# Patient Record
Sex: FEMALE | Race: WHITE
[De-identification: names, ages, dates, MRNs, and addresses within clinical notes are randomized per-mention and may not be internally consistent; named-entity substitution may affect disease eponyms.]

---

## 2018-10-11 ENCOUNTER — HOSPITAL ENCOUNTER (EMERGENCY)
Dept: HOSPITAL 97 - ER | Age: 63
Discharge: ANOTHER HOSPITAL | End: 2018-10-11
Payer: COMMERCIAL

## 2018-10-11 DIAGNOSIS — D43.1: Primary | ICD-10-CM

## 2018-10-11 LAB
ALBUMIN SERPL BCP-MCNC: 3.8 G/DL (ref 3.4–5)
ALP SERPL-CCNC: 90 U/L (ref 45–117)
ALT SERPL W P-5'-P-CCNC: 16 U/L (ref 12–78)
ANISOCYTOSIS BLD QL: (no result)
AST SERPL W P-5'-P-CCNC: 33 U/L (ref 15–37)
BLD SMEAR INTERP: (no result)
BUN BLD-MCNC: 16 MG/DL (ref 7–18)
GIANT PLATELETS BLD QL SMEAR: (no result)
GLUCOSE SERPLBLD-MCNC: 95 MG/DL (ref 74–106)
HCT VFR BLD CALC: 33.8 % (ref 36–45)
INR BLD: 1.09
LYMPHOCYTES # SPEC AUTO: 1.1 K/UL (ref 0.7–4.9)
MAGNESIUM SERPL-MCNC: 2.3 MG/DL (ref 1.8–2.4)
MCH RBC QN AUTO: 22.8 PG (ref 27–35)
MCV RBC: 69.7 FL (ref 80–100)
MORPHOLOGY BLD-IMP: (no result)
NT-PROBNP SERPL-MCNC: 28 PG/ML (ref ?–125)
PMV BLD: 9.2 FL (ref 7.6–11.3)
POIKILOCYTOSIS BLD QL SMEAR: (no result)
POTASSIUM SERPL-SCNC: 3.4 MMOL/L (ref 3.5–5.1)
RBC # BLD: 4.85 M/UL (ref 3.86–4.86)
STOMATOCYTES BLD QL SMEAR: (no result)
TROPONIN (EMERG DEPT USE ONLY): < 0.02 NG/ML (ref 0–0.04)

## 2018-10-11 PROCEDURE — 83880 ASSAY OF NATRIURETIC PEPTIDE: CPT

## 2018-10-11 PROCEDURE — 85610 PROTHROMBIN TIME: CPT

## 2018-10-11 PROCEDURE — 83735 ASSAY OF MAGNESIUM: CPT

## 2018-10-11 PROCEDURE — 70450 CT HEAD/BRAIN W/O DYE: CPT

## 2018-10-11 PROCEDURE — 84484 ASSAY OF TROPONIN QUANT: CPT

## 2018-10-11 PROCEDURE — 80076 HEPATIC FUNCTION PANEL: CPT

## 2018-10-11 PROCEDURE — 85025 COMPLETE CBC W/AUTO DIFF WBC: CPT

## 2018-10-11 PROCEDURE — 93005 ELECTROCARDIOGRAM TRACING: CPT

## 2018-10-11 PROCEDURE — 36415 COLL VENOUS BLD VENIPUNCTURE: CPT

## 2018-10-11 PROCEDURE — 96374 THER/PROPH/DIAG INJ IV PUSH: CPT

## 2018-10-11 PROCEDURE — 80048 BASIC METABOLIC PNL TOTAL CA: CPT

## 2018-10-11 PROCEDURE — 99285 EMERGENCY DEPT VISIT HI MDM: CPT

## 2018-10-11 NOTE — RAD REPORT
EXAM DESCRIPTION:  CT - Head Brain Wo Cont - 10/11/2018 4:40 pm

 

CLINICAL HISTORY:  Alteration of consciousness/confusion

 

COMPARISON:  None.

 

TECHNIQUE:  Computed axial tomography of the head was obtained. IV contrast was not requested.

 

All CT scans are performed using dose optimization technique as appropriate and may include automated
 exposure control or mA/KV adjustment according to patient size.

 

FINDINGS:  3.9 centimeter mass is present within the left frontal lobe. Large amount of surrounding v
asogenic edema is seen.

 

Shift of the midline structures 9 millimeters to the right is present.

 

Intracranial bleed is not noted.

 

IMPRESSION:  3.9 centimeter mass the left frontal lobe with large amount of surrounding vasogenic raghav
ma could either represent a primary neoplasm or metastasis.

 

Examination was discussed with Rikki Gamino in the Emergency Room at 4:50 p.m. on 10/11/2018

## 2018-10-11 NOTE — EKG
Test Date:    2018-10-11               Test Time:    16:26:33

Technician:   RUSS                                     

                                                     

MEASUREMENT RESULTS:                                       

Intervals:                                           

Rate:         68                                     

WV:           170                                    

QRSD:         76                                     

QT:           394                                    

QTc:          418                                    

Axis:                                                

P:            48                                     

WV:           170                                    

QRS:          -7                                     

T:            18                                     

                                                     

INTERPRETIVE STATEMENTS:                                       

                                                     

Normal sinus rhythm

Cannot rule out Anterior infarct, age undetermined

Abnormal ECG

No previous ECG available for comparison



Electronically Signed On 10-11-18 16:50:20 CDT by Matty Powers

## 2018-10-11 NOTE — EDPHYS
Physician Documentation                                                                           

 [facility]                                                                

Name: Mary Closs                                                                                  

Age: 63 yrs                                                                                       

Sex: Female                                                                                       

: 1955                                                                                   

MRN: [id number]                                                                                   

Arrival Date: 10/11/2018                                                                          

Time: 15:42                                                                                       

Account#: [id number]                                                                            

Bed External Waiting                                                                              

Private MD:                                                                                       

ED Physician Michael Hsieh                                                                       

HPI:                                                                                              

10/11                                                                                             

16:18 This 63 yrs old  Female presents to ER via Ambulatory with complaints of       cp  

      Memory Loss.                                                                                

16:18 The patient's problem is reported as difficulty with memory. Onset: The                 cp  

      symptoms/episode began/occurred gradually, and became worse 4 day(s) ago. Duration: The     

      episode is continuous. Associated signs and symptoms: Pertinent negatives: abdominal        

      pain, chest pain, headache, palpitations, weakness. Severity of symptoms: in the            

      emergency department the symptoms are unchanged. Patient's baseline: Neuro: alert and       

      fully oriented, Motor: no deficits, Ambulation: walks without assistance, Speech:           

      normal.                                                                                     

                                                                                                  

Historical:                                                                                       

- Allergies:                                                                                      

15:45 No Known Allergies;                                                                     sv  

- Home Meds:                                                                                      

15:45 Prevacid Oral [Active]; Iron CR Oral [Active];                                          sv  

- PSHx:                                                                                           

15:45 gastric sleeve; esophagus;                                                              sv  

                                                                                                  

- Immunization history:: Adult Immunizations unknown.                                             

- Social history:: Smoking status: Patient/guardian denies using tobacco.                         

- Ebola Screening: : No symptoms or risks identified at this time.                                

                                                                                                  

                                                                                                  

ROS:                                                                                              

16:20 Constitutional: Negative for body aches, chills, fever, poor PO intake.                 cp  

16:20 Eyes: Negative for injury, pain, redness, and discharge.                                cp  

16:20 Cardiovascular: Negative for chest pain, edema, palpitations.                               

16:20 Respiratory: Negative for cough, shortness of breath, wheezing.                             

16:20 Abdomen/GI: Negative for abdominal pain, nausea, vomiting, and diarrhea.                    

16:20 Neuro: Negative for altered mental status, dizziness, headache, numbness, seizure           

      activity, visual changes, weakness.                                                         

16:20 All other systems are negative.                                                             

                                                                                                  

Exam:                                                                                             

16:31 ECG was reviewed by the Attending Physician.                                            cp  

16:39 Head/Face:  Normocephalic, atraumatic. Eyes:  Pupils equal round and reactive to light, cp  

      extra-ocular motions intact.  Lids and lashes normal.  Conjunctiva and sclera are           

      non-icteric and not injected.  Cornea within normal limits.  Periorbital areas with no      

      swelling, redness, or edema. ENT:  Nares patent. No nasal discharge, no septal              

      abnormalities noted.  Tympanic membranes are normal and external auditory canals are        

      clear.  Oropharynx with no redness, swelling, or masses, exudates, or evidence of           

      obstruction, uvula midline.  Mucous membranes moist. Neck:  Trachea midline, no             

      thyromegaly or masses palpated, and no cervical lymphadenopathy.  Supple, full range of     

      motion without nuchal rigidity, or vertebral point tenderness.  No Meningismus.             

      Chest/axilla:  Normal chest wall appearance and motion.  Nontender with no deformity.       

      No lesions are appreciated.                                                                 

16:39 Constitutional: The patient appears in no acute distress, alert, awake,                     

      non-diaphoretic, non-toxic, well developed, well nourished.                                 

16:39 Cardiovascular: Rate: normal, Rhythm: regular, Heart sounds: murmur, not appreciated,       

      rub, not appreciated, gallop, not appreciated, Edema: is not appreciated, JVD: is not       

      appreciated.                                                                                

16:39 Respiratory: the patient does not display signs of respiratory distress,  Respirations:     

      normal, no use of accessory muscles, no retractions, no splinting, no tachypnea,            

      labored breathing, is not present, Breath sounds: are clear throughout, no decreased        

      breath sounds, no stridor, no wheezing.                                                     

16:39 Abdomen/GI: Inspection: abdomen appears normal, Palpation: abdomen is soft and              

      non-tender, in all quadrants.                                                               

16:39 Skin: cellulitis, is not appreciated, no rash present.                                      

16:39 Neuro: Orientation: to person, place \T\ time. Mentation: lucid, able to follow commands,   

      Cerebellar function: Romberg testing is negative, normal finger to nose testing, Motor:     

      moves all fours, strength is normal, Sensation: no obvious gross deficits, Gait: is         

      steady, at a normal pace, without difficulty.                                               

16:55 Radiologist reports: frontal lobe mass                                                  cp  

                                                                                                  

Vital Signs:                                                                                      

15:45  / 70; Pulse 87; Resp 18; Temp 98.8(TE); Pulse Ox 100% on R/A; Weight 97.52 kg;   sv  

      Height 5 ft. 1 in. (154.94 cm); Pain 0/10;                                                  

17:13  / 61; Pulse 81; Resp 18; Pulse Ox 100% on R/A;                                   ph  

15:45 Body Mass Index 40.62 (97.52 kg, 154.94 cm)                                             sv  

                                                                                                  

MDM:                                                                                              

16:07 Patient medically screened.                                                             cp  

17:00 Differential diagnosis: CVA, TIA, Dementia, Alzheimer disease, metabolic disorder, drug cp  

      effects, brain mass.                                                                        

17:19 Physician consultation: DR Ch, neurologist \T\[facility], will accept patient as transfer. cp  

17:34 Data reviewed: vital signs, nurses notes, lab test result(s), EKG, radiologic studies,  cp  

      CT scan.                                                                                    

                                                                                                  

10/11                                                                                             

16:16 Order name: Basic Metabolic Panel; Complete Time: 17:14                                   

10/11                                                                                             

17:32 Interpretation: Normal except: K 3.4; GFR 72.                                             

10/11                                                                                             

16:16 Order name: CBC with Diff; Complete Time: 17:32                                           

10/11                                                                                             

17:32 Interpretation: Normal except: HGB 11.1; HCT 33.8; MCV 69.7; MCH 22.8; RDW 26.2.          

10/11                                                                                             

16:16 Order name: LFT's; Complete Time: 17:14                                                   

10/11                                                                                             

16:16 Order name: Magnesium; Complete Time: 17:14                                               

10/11                                                                                             

16:16 Order name: NT PRO-BNP; Complete Time: 17:14                                              

10/11                                                                                             

16:16 Order name: PT-INR; Complete Time: 17:14                                                  

10/11                                                                                             

16:16 Order name: Troponin (emerg Dept Use Only); Complete Time: 17:14                          

10/11                                                                                             

16:16 Order name: EKG; Complete Time: 16:16                                                     

10/11                                                                                             

16:16 Order name: Cardiac monitoring; Complete Time: 16:45                                      

10/11                                                                                             

16:16 Order name: EKG - Nurse/Tech; Complete Time: 16:45                                        

10/11                                                                                             

16:16 Order name: CT Head Brain wo Cont; Complete Time: 16:57                                   

10/11                                                                                             

17:08 Order name: CBC Smear Scan; Complete Time: 17:32                                        EDMS

10/11                                                                                             

16:16 Order name: IV Saline Lock; Complete Time: 16:45                                          

10/11                                                                                             

16:16 Order name: Labs collected and sent; Complete Time: 16:44                                 

10/11                                                                                             

16:16 Order name: O2 Per Protocol; Complete Time: 16:44                                         

10/11                                                                                             

16:16 Order name: O2 Sat Monitoring; Complete Time: 16:44                                       

10/11                                                                                             

16:16 Order name: Accucheck Blood Glucose; Complete Time: 17:07                               cp  

                                                                                                  

EC:31 Rate is 68 beats/min. Rhythm is regular. RI interval is normal. QRS interval is normal. cp  

      QT interval is normal. Interpreted by me. Reviewed by me.                                   

                                                                                                  

Administered Medications:                                                                         

18:26 Drug: Decadron - Dexamethasone 10 mg Route: IVP; Site: left antecubital;                ph  

20:04 Follow up: Response: No adverse reaction                                                ph  

18:26 Drug: Potassium Effervescent Tablet 25 mEq Route: PO;                                   ph  

20:04 Follow up: Response: No adverse reaction                                                ph  

                                                                                                  

                                                                                                  

Disposition:                                                                                      

10/11/18 17:36 Transfer ordered to [facility]. Diagnosis is Neoplasm of       

  uncertain behavior of brain, unspecified - Left Frontal Lobe.                                   

- Reason for transfer: Higher level of care.                                                      

- Accepting physician is DR Ch.                                                                  

- Condition is Stable.                                                                            

- Problem is new.                                                                                 

- Symptoms are unchanged.                                                                         

                                                                                                  

                                                                                                  

                                                                                                  

Addendum:                                                                                         

10/13/2018                                                                                        

     13:34 Co-signature as Attending Physician, Michael Hsieh MD I agree with the assessment and   k
dr

           plan of care.                                                                          

                                                                                                  

Signatures:                                                                                       

Dispatcher [employer]                           Patrica Crespo, RN                    RN                                                      

Michael Hsieh MD MD   [facility]                                                  

Estela Overton RN                      RN   ph                                                   

Kuldip Gamino PA                         PA   cp                                                   

Dottie Martel, RN                        RN   tl2                                                  

                                                                                                  

Corrections: (The following items were deleted from the chart)                                    

10/11                                                                                             

20:59 17:36 10/11/2018 17:36 Transfer ordered to [facility]. Diagnosis is tl2 

      Neoplasm of uncertain behavior of brain, unspecified - Left Frontal Lobe. Reason for        

      transfer: Higher level of care. Accepting physician is DR Ch. Condition is Stable.         

      Problem is new. Symptoms are unchanged. cp                                                  

                                                                                                  

**************************************************************************************************

## 2018-10-11 NOTE — ER
Nurse's Notes                                                                                     

 [facility]                                                                

Name: Mary Closs                                                                                  

Age: 63 yrs                                                                                       

Sex: Female                                                                                       

: 1955                                                                                   

MRN: [id number]                                                                                   

Arrival Date: 10/11/2018                                                                          

Time: 15:42                                                                                       

Account#: [id number]                                                                            

Bed External Waiting                                                                              

Private MD:                                                                                       

Diagnosis: Neoplasm of uncertain behavior of brain, unspecified-Left Frontal Lobe                 

                                                                                                  

Presentation:                                                                                     

10/11                                                                                             

15:43 Presenting complaint: Patient states: unable to complete sentences and has had memory   sv  

      loss x at least 2 days. Transition of care: patient was not received from another           

      setting of care. Care prior to arrival: None.                                               

15:43 Method Of Arrival: Ambulatory                                                           sv  

15:43 Acuity: COREY 2                                                                           sv  

17:06 Onset of symptoms was 2018. Risk Assessment: Do you want to hurt yourself   ph  

      or someone else? Patient reports no desire to harm self or others. Initial Sepsis           

      Screen: Does the patient meet any 2 criteria? No. Patient's initial sepsis screen is        

      negative. Does the patient have a suspected source of infection? No. Patient's initial      

      sepsis screen is negative.                                                                  

                                                                                                  

Historical:                                                                                       

- Allergies:                                                                                      

15:45 No Known Allergies;                                                                     sv  

- Home Meds:                                                                                      

15:45 Prevacid Oral [Active]; Iron CR Oral [Active];                                          sv  

- PSHx:                                                                                           

15:45 gastric sleeve; esophagus;                                                              sv  

                                                                                                  

- Immunization history:: Adult Immunizations unknown.                                             

- Social history:: Smoking status: Patient/guardian denies using tobacco.                         

- Ebola Screening: : No symptoms or risks identified at this time.                                

                                                                                                  

                                                                                                  

Screenin:45 Abuse screen: Denies threats or abuse. Denies injuries from another. Nutritional        ph  

      screening: No deficits noted. Tuberculosis screening: No symptoms or risk factors           

      identified. Fall Risk None identified.                                                      

                                                                                                  

Assessment:                                                                                       

16:15 General: Appears in no apparent distress. comfortable, well groomed, Behavior is calm,  ph  

      cooperative, appropriate for age. Pain: Denies pain. Neuro: Level of Consciousness is       

      awake, alert, obeys commands, Oriented to person, place, time, situation,  are         

      equal bilaterally Moves all extremities. Full function Gait is steady, Speech with          

      expressive aphasia noted, Facial symmetry appears normal, Pupils are PERRLA, Intact         

      Reports reports that her mind "feels foggy" and that she is having difficulty focusing      

      and completing her normal daily tasks. Cardiovascular: Denies chest pain,                   

      lightheadedness, nausea, palpitations, shortness of breath, Capillary refill < 3            

      seconds in bilateral fingers Patient's skin is warm and dry. Respiratory: Airway is         

      patent Respiratory effort is even, unlabored, Respiratory pattern is regular,               

      symmetrical, Breath sounds are clear bilaterally. GI: No signs and/or symptoms were         

      reported involving the gastrointestinal system. Derm: Skin is intact, is healthy with       

      good turgor, Skin is pink, warm \T\ dry. Musculoskeletal: Circulation, motion, and          

      sensation intact. Range of motion: intact in all extremities.                               

17:05 Reassessment: Patient appears in no apparent distress at this time. Patient and/or      ph  

      family updated on plan of care and expected duration. Pain level reassessed. Pt resting     

      comfortably, family at bedside, LE Bess and RN at bedside to speak w/ pt and         

      family about CT results, pt to be transferred, awaiting acceptance.                         

18:24 Reassessment: Patient appears in no apparent distress at this time. Patient and/or      ph  

      family updated on plan of care and expected duration. Pain level reassessed. Patient is     

      alert, oriented x 3, equal unlabored respirations, skin warm/dry/pink. Report called to     

      RN at [facility].                                                             

                                                                                                  

Vital Signs:                                                                                      

15:45  / 70; Pulse 87; Resp 18; Temp 98.8(TE); Pulse Ox 100% on R/A; Weight 97.52 kg;   sv  

      Height 5 ft. 1 in. (154.94 cm); Pain 0/10;                                                  

17:13  / 61; Pulse 81; Resp 18; Pulse Ox 100% on R/A;                                   ph  

15:45 Body Mass Index 40.62 (97.52 kg, 154.94 cm)                                             sv  

                                                                                                  

ED Course:                                                                                        

15:42 Patient arrived in ED.                                                                  as  

15:44 Triage completed.                                                                       sv  

15:45 Arm band placed on.                                                                     sv  

16:06 Estela Overton, DALIA is Primary Nurse.                                                    ph  

16:07 Kuldip Gamino PA is PHCP.                                                                cp  

16:07 Michael Hsieh MD is Attending Physician.                                              cp  

16:30 Missed attempt(s): 20 gauge in right antecubital area. Bleeding controlled, band aid    dh3 

      applied, catheter tip intact.                                                               

16:35 Initial lab(s) drawn, by me, sent to lab. Inserted saline lock: 20 gauge in left        dh3 

      antecubital area, using aseptic technique. Blood collected.                                 

16:39 CT Head Brain wo Cont In Process Unspecified.                                           EDMS

16:54 EKG done, by EKG tech. reviewed by Kuldip LUJAN.                                       sm3 

17:05 No provider procedures requiring assistance completed. Patient transferred, IV remains  ph  

      in place.                                                                                   

17:06 Patient has correct armband on for positive identification. Placed in gown. Bed in low  ph  

      position. Call light in reach. Side rails up X 1. Pulse ox on. NIBP on. Warm blanket        

      given.                                                                                      

                                                                                                  

Administered Medications:                                                                         

18:26 Drug: Decadron - Dexamethasone 10 mg Route: IVP; Site: left antecubital;                ph  

20:04 Follow up: Response: No adverse reaction                                                ph  

18:26 Drug: Potassium Effervescent Tablet 25 mEq Route: PO;                                   ph  

20:04 Follow up: Response: No adverse reaction                                                ph  

                                                                                                  

                                                                                                  

Outcome:                                                                                          

17:36 ER care complete, transfer ordered by MD.                                               cp  

20:58 Transferred by ground EMS Transfer form completed.                                      tl2 

20:58 Condition: stable                                                                           

20:58 Discharge instructions given to patient, Instructed on the need for transfer.               

20:59 Patient left the ED.                                                                    tl2 

                                                                                                  

Signatures:                                                                                       

Dispatcher [employer]                           EDMS                                                 

Patrica Mae RN                    RN   Allison Crockett Patricia, RN RN ph Page, Corey, PA PA cp Knox, Taylor, RN                        RN   2                                                  

Carolina Lovett                              [facility]                                                  

Brooke Montaño                              3                                                  

                                                                                                  

Corrections: (The following items were deleted from the chart)                                    

15:48 15:45 Pulse 87bpm; Resp 18bpm; Pulse Ox 100%; Temp 98.8F; 97.52 kg; Height 5 ft. 1 in.; sv  

      BMI: 40.6; sv                                                                               

                                                                                                  

**************************************************************************************************

## 2020-01-22 ENCOUNTER — HOSPITAL ENCOUNTER (EMERGENCY)
Dept: HOSPITAL 97 - ER | Age: 65
Discharge: ANOTHER HOSPITAL | End: 2020-01-22
Payer: COMMERCIAL

## 2020-01-22 VITALS — TEMPERATURE: 97.5 F

## 2020-01-22 VITALS — SYSTOLIC BLOOD PRESSURE: 112 MMHG | DIASTOLIC BLOOD PRESSURE: 63 MMHG | OXYGEN SATURATION: 100 %

## 2020-01-22 DIAGNOSIS — Z85.841: ICD-10-CM

## 2020-01-22 DIAGNOSIS — Z85.42: ICD-10-CM

## 2020-01-22 DIAGNOSIS — G93.6: Primary | ICD-10-CM

## 2020-01-22 DIAGNOSIS — G93.89: ICD-10-CM

## 2020-01-22 LAB
BUN BLD-MCNC: 23 MG/DL (ref 7–18)
GLUCOSE SERPLBLD-MCNC: 91 MG/DL (ref 74–106)
HCT VFR BLD CALC: 40.1 % (ref 36–45)
LYMPHOCYTES # SPEC AUTO: 1 K/UL (ref 0.7–4.9)
PMV BLD: 9.4 FL (ref 7.6–11.3)
POTASSIUM SERPL-SCNC: 3.5 MMOL/L (ref 3.5–5.1)
RBC # BLD: 4.45 M/UL (ref 3.86–4.86)

## 2020-01-22 PROCEDURE — 36415 COLL VENOUS BLD VENIPUNCTURE: CPT

## 2020-01-22 PROCEDURE — 80048 BASIC METABOLIC PNL TOTAL CA: CPT

## 2020-01-22 PROCEDURE — 96374 THER/PROPH/DIAG INJ IV PUSH: CPT

## 2020-01-22 PROCEDURE — 70450 CT HEAD/BRAIN W/O DYE: CPT

## 2020-01-22 PROCEDURE — 85025 COMPLETE CBC W/AUTO DIFF WBC: CPT

## 2020-01-22 PROCEDURE — 93005 ELECTROCARDIOGRAM TRACING: CPT

## 2020-01-22 PROCEDURE — 99285 EMERGENCY DEPT VISIT HI MDM: CPT

## 2020-01-22 NOTE — XMS REPORT
Summary of Care

 Created on:2019



Patient:Closs, Mary Landry

Sex:Female

:1955

External Reference #:[id number]





Demographics







 Address  [address] [place] [address]

 

 Home Phone  [phone]

 

 Mobile Phone  [phone]

 

 Phone  [phone]

 

 Email Address  [email or web address]

 

 Preferred Language  English

 

 Marital Status  Single

 

 Scientologist Affiliation  Unknown

 

 Race  White

 

 Ethnic Group  Not  or 









Author







 Organization  [facility]

 

 Address  [address] [place] [address]









Support







 Name  Relationship  Address  Phone

 

 Chloe Allen  Sister  Unavailable  Unavailable

 

 Melvin Ponce  Unavailable  [address]  [phone]



     [place] [address]  









Care Team Providers







 Name  Role  Phone

 

 Unavailable  Primary Care Provider  Unavailable









Reason for Visit







 Reason  Comments

 

 Initial Consultation  



Consult, Test &amp; Treat (Routine)





 Status  Reason  Specialty  Diagnoses /  Referred By  Referred To



       Procedures  Contact  Contact

 

 Authorization Not    Hematology and  Diagnoses



Other secondary thrombocytopenia



Chemotherapy-induced thrombocytopenia



NP D69.59 (ICD-10-CM) - Other secondary thrombocytopenia



D69.59,T45.1X5A (ICD-10-CM) - Chemotherapy-induced thrombocytopenia



  Tung,  Mn Heme-Onc







 Needed    Oncology  NP D69.59 (ICD-10-CM) - Other secondary thrombocytopenia



D69.59,T45.1X5A (ICD-10-CM) - Chemotherapy-induced thrombocytopenia  Alphonse  
[id number]



         MD Elin



  [facility]







         [address] [place] [address]  [address]







         [address] [place] [address]



           [address]







           Phone:



           [phone]







           Fax:



           [phone]









Encounter Details







 Date  Type  Department  Care Team  Description

 

 2019  Office Visit  [facility]  Kenan Yeboah  Initial Consultation



     Hematology and  MD Dawson



  



     Oncology



  [address]



  



     [address] [place] [address]



  



     7th Floor, [address]



  [phone]



  



     [place] [address] [phone] (Fax)  



     [phone]    







Allergies

No Known Allergiesdocumented as of this encounter (statuses as of 2019)



Medications







 Medication  Sig  Dispensed  Refills  Start Date  End Date  Status

 

 Lansoprazole (PREVACID  Take  by mouth.    0      Active



 OR)            

 

 Fe Cbn-Fe  Take 1 tablet by  30 Each  3  2019    Active



 Gluc-FA-B12-C-DSS  mouth daily.          



 (FERRALET 90) 90-1 MG            



 TABS            



documented as of this encounter (statuses as of 2019)



Active Problems







 Problem  Noted Date

 

 Endometrial cancer  2018



documented as of this encounter (statuses as of 2019)



Social History







 Tobacco Use  Types  Packs/Day  Years Used  Date

 

 Never Smoker        









 Smokeless Tobacco: Never Used      









 Alcohol Use  Drinks/Week  oz/Week  Comments

 

 No      









 Alcohol Habits  Answer  Date Recorded

 

 How often do you have a drink containing alcohol?  Never  2018

 

 How many drinks containing alcohol do you have on a typical  Not asked  



 day when you are drinking?    

 

 How often do you have six or more drinks on one occasion?  Not asked  









 Sex Assigned at Birth  Date Recorded

 

 Not on file  









 Job Start Date  Occupation  Industry

 

 Not on file  Not on file  Not on file









 Travel History  Travel Start  Travel End









 No recent travel history available.



documented as of this encounter



Last Filed Vital Signs







 Vital Sign  Reading  Time Taken  Comments

 

 Blood Pressure  128/79  2019  2:55 PM CDT  

 

 Pulse  81  2019  2:55 PM CDT  

 

 Temperature  36.9 C (98.4 F)  2019  2:55 PM CDT  

 

 Respiratory Rate  18  2019  2:55 PM CDT  

 

 Oxygen Saturation  -  -  

 

 Inhaled Oxygen Concentration  -  -  

 

 Weight  100.2 kg (221 lb)  2019  2:55 PM CDT  

 

 Height  154.9 cm (5' 1")  2019  2:55 PM CDT  

 

 Body Mass Index  41.76  2019  2:55 PM CDT  



documented in this encounter



Patient Instructions

Patient InstructionsCandace Thurman CMA - 2019  3:00 [facility]

SECTION OF ONCOLOGY/HEMATOLOGY

[phone]

[phone]



Please note that all labs and or imaging results will be discussed at the next 
office visit unless told otherwise.



if a problem occurs after hours please contact our office and have physician on 
call paged 



Patient Instructions: (to be completed before next visit)

Please come in for labs  1 hour prior to your appointment at [facility] 7th floor



 Will need to add Vitamin B12, Ferritin, Fibrinogen,Folate, PT/INR, PTT

Will send rx for Ferralet to [facility]



RTC in 6 weeks



TELL US ABOUT YOUR EXPERIENCE

You may receive an email or letter from [facility] via our 
partner, Werner Guzman. This is a survey about your experience today.

Your feedback is important to us so we can improve.

If any question does not apply to your visit, please leave it blank.

Our goal is to ensure you have an exceptional experience at [facility]. If for any reason you cannot rate your experience as very good, 
please let a member of our staff know so wecan make immediate improvements.



Please don't hesitate to call if you have any questions or concerns before your 
appt.



Thanks



Candace Thurman CMA



Electronically signed by Candace Thurman CMA at 2019  4:16 PM CDT

documented in this encounter



Progress Notes

Chasity Morgan, FNP-BC - 2019  3:00 PM CDTFormatting of this note might be 
different from the original.

/79 (BP Location: left arm, Patient Position: Sitting, Cuff Size: regular
)  | Pulse 81  | Temp98.4 F (36.9 C) (Oral)  | Resp 18  | Ht 5' 1" (1.549 m
)  | Wt 221 lb (100.2 kg)  | BMI 41.76 kg/m



          Hematology Consultation Note



HPI: Ms.Closs is 64 y.o. female presents today for evaluation of 
thrombocytopenia.Hx of Stage IVB high grade endometrial/serous carcinoma s/p 
NACT, craniotomy and SBRT to brain, and interval debulking surgery with EL/RON/
BSO/PLND. She received total of cycle 6 chemotherapy.



In 2018,she developed acute encephalopathy (her  noted 
progressively worsening word-finding difficulties and an inability to finish 
her sentences) and was diagnosed with a brain and uterine mass at [facility] in 
[place]. Was later transferred to [facility],brain mass was resected 
which was followed by SBRT to brain. This was followed by interval debulking 
surgery with EL/RON/BSO/PLND. So far, she received cycle 6 chemotherapy and 
plan was to complete 3 more. However, she developed thrombocytopenia (platelet'
s in the 70s) and further chemo sessions were held until hemology evaluation by 
. Last chemo session: 19.



2019 2019 2019 2019 2019 2019 2019 2018 
10/17/2018 10/16/964271/ 10/15/2018 10/12/2018 

9.6 (L) 7.5 (L) 8.5 (L) 9.0 (L) 8.3 (L) 8.2 (L)  8.9 (L) 9.1 (L) 8.8 (L) 8.4 (
L) 9.8 (L) 10.7 (L)

31.5 (L) 24.9 (L) 27.8 (L) 26.0 (L) 28.8 (L)   30.9 (L) 30.1 (L) 29.1 (L) 
28.2 (L) 33.3 (L) 36.0

4.4 4.5 5.8  3.1 (L)   7.4   14.2 (H) 9.9 5.7

4.06 3.25 (L) 3.66 (L)  3.86 (L)   4.14   3.79 (L) 4.48 4.95

77.6 (L) 76.6 (L) 76.0 (L)  74.6 (L)   74.6 (L)   74.4 (L) 74.3 (L) 
72.7 (L)

23.6 (L) 23.1 (L) 23.2 (L)  21.5 (L)   21.5 (L)   22.2 (L) 21.9 (L) 
21.6 (L)

30.5 (L) 30.1 (L) 30.6 (L)  28.8 (L)   28.8 (L)   29.8 (L) 29.4 (L) 
29.7 (L)

21.1 (H) 21.3 (H) 20.8 (H)  22.5 (H)   19.9 (H)     24.4 (H)

153 126 (L) 118 (L)  149 (L)  109 (L) 363   220 226 230

9.6 9.8 9.9  10.4   9.0 (L)   10.8 11.6 10.3

0 0 0  0   0   0



PMH:

 Uterine Adenocarcinoma

 Atypical lobular hyperplasia (ALH) of left breast

 Brain mass



FH:

Mother: Alive, Dementia

Father: , CHF



SH:

No tobacco use

Etoh use: Rarely



Social hx:

2 Boys

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



Surgical hx:

Tonsillectomy 1975

 , 

Vertical Sleeve surgery- with esophogeal repair.

HX APPENDECTOMY

 HX CRANIOTOMY 10/2018

[facility]

 HX HYSTERECTOMY

EL/RON/BSO/BLPLND interval debulkt for IVB endometrial cancer

 HX LAP BAND

 HX RON AND BSO 2019

s/p exlap, RON, BSO, BPLND, optimal TRS at [facility]

 HX TONSILLECTOMY



Review of Systems

Constitutional: Positive for malaise/fatigue. Negative for fever and weight 
loss.

HENT: Negative for nosebleeds.

Eyes: Negative for blurred vision and double vision.

Respiratory: Negative for cough and shortness of breath.

Cardiovascular: Negative for chest pain, palpitations and leg swelling.

Gastrointestinal: Negative for abdominal pain, blood in stool, constipation, 
diarrhea, heartburn, nausea and vomiting.

Musculoskeletal: Positive for myalgias.

Skin: Negative.

Neurological: Negative for dizziness and headaches.

Endo/Heme/Allergies: Negative for environmental allergies. Does not bruise/
bleed easily.

Psychiatric/Behavioral: Negative for depression. The patient does not have 
insomnia.



No Known Allergies



Outpatient Medications Prior to Visit

Medication Sig Dispense Refill

 Lansoprazole (PREVACID OR) Take  by mouth.



No facility-administered medications prior to visit.





Family History

Problem Relation Name Age of Onset

 Unremarkable Mother

 Dementia Mother

 Other  (Congestive Heart Failure) Mother

 Unremarkable Father

 Heart Problems Father

 Heart Failure Sister

 Other  (Juvenile Diabetes) Sister





Social History

  Socioeconomic History

    Marital status: Single

    Spouse name: Not on file

    Number of children: Not on file

    Years of education: Not on file

    Highest education level: Not on file

  Occupational History

    Not on file

  Social Needs

    Financial resource strain: Not on file

    Food insecurity:

      Worry: Not on file

      Inability: Not on file

    Transportation needs:

      Medical: Not on file

      Non-medical: Not on file

  Tobacco Use

    Smoking status: Never Smoker

    Smokeless tobacco: Never Used

  Substance and Sexual Activity

    Alcohol use: No

      Frequency: Never

    Drug use: No

    Sexual activity: Not on file

  Lifestyle

    Physical activity:

      Days per week: Not on file

      Minutes per session: Not on file

    Stress: Not on file

  Relationships

    Social connections:

      Talks on phone: Not on file

      Gets together: Not on file

      Attends Islam service: Not on file

      Active member of club or organization: Not on file

      Attends meetings of clubs or organizations: Not on file

      Relationship status: Not on file

    Intimate partner violence:

      Fear of current or ex partner: Not on file

      Emotionally abused: Not on file

      Physically abused: Not on file

      Forced sexual activity: Not on file

  Other Topics

    Concerns:

      Not on file

  Social History Narrative

    Not on file





Past Surgical History:

Procedure Laterality Date

 HX  SECTION

 HX CRANIOTOMY

 HX LAPAROSCOPIC SLEEVE GASTRECTOMY

 HX TONSILLECTOMY





ECO

General: Alert, well appearing, no acute distress.

Abdomen: Bowel sounds are normoactive.  S/NT/ND. No palpable masses or 
organomegaly, well healed vertical abdominal incision noted

Eyes : Conjunctivae and lids normal. The sclera is clear and anicteric

Heart : Regular rate and rhythm, normal S1 and S2. No murmurs, gallops, or 
rubs. No extra sounds.

Lungs : Normal respiratory effort. There is normal air entry with normal breath 
sounds. No rales, rhonchi, rubs or wheezes.

Extr: No varicosities or ulceration. No clubbing, cyanosis, or edema.

Skin : Warm and dry No rashes, lesions or ulcerations.

Head : Normocephalic, atraumatic, healed surgical scar noted

ENT : No oral lesions. Oropharynx shows no mucositis, erythema or exudate

Neck : Supple. No cervical adenopathy.

Nodes : No axillary, supraclavicular, or cervical lymphadenopathy.

Neurologic: The patient is oriented to time, place, and person. Muscle tone is 
normal.The gait was normal and there were no difficulties in coordination.

Psych : Appropriate affect and behavior. Thought content is normal.



Assessment and Plan:Ms.Closs is 64 y.o. female presents today for evaluation of 
thrombocytopenia.Hx of Stage IVB high grade endometrial/serous carcinoma s/p 
NACT, craniotomy and SBRT to brain, and interval debulking surgery with EL/RON/
BSO/PLND.She received cycle 6 chemotherapy.Presents today for evaluation of 
thrombocytopenia.



#Thrombocytopenia likely secondary to chemo? (Carboplatin+Taxol)

-Will check peripheral smear today

-Check Fibrinogen, PT/INR, PTT to r/o DIC.



#Microcytosis

-Vitamin B12, Folate, Ferritin-will add to existing labs.

-Will send rx for Ferralet to local [facility], take 1 tab/day.



Patient's plan of care reviewed with Dr. Yeboah



Follow up in 6 weeks with CBC, CMP prior to next visit



Chasity Morgan, MSN, FNP

Nurse Practitioner

Hematology Oncology

[facility]

Phone: [phone]

Fax: [phone]

Electronically signed by Chasity Morgan FNP-BC at 2019  4:22 PM 
CDTdocumented in this encounter



Plan of Treatment







 Date  Type  Specialty  Care Team  Description

 

 2019  Office Visit  Hematology and Oncology  Kenan Yeboah MD



  



       [address] [place], [place] [address] [phone] (Fax)  









 Name  Type  Priority  Associated Diagnoses  Order Schedule

 

 APTT  Lab  Routine  Thrombocytopenia  Ordered: 2019

 

 PROTIME-INR  Lab  Routine  Thrombocytopenia  Ordered: 2019

 

 VITAMIN B12  Lab  Routine  Anemia, unspecified type  Ordered: 2019

 

 FOLATE RBC  Lab  Routine  Anemia, unspecified type  Ordered: 2019

 

 FIBRINOGEN  Lab  Routine  Thrombocytopenia  Ordered: 2019

 

 FERRITIN  Lab  Routine  Anemia, unspecified type  Ordered: 2019









 Health Maintenance  Due Date  Last Done  Comments

 

 COLON CANCER SCREENING: COLONOSCOPY  1955    

 

 TETANUS SHOT (ADULT)  1969    

 

 BMI FOLLOW UP PLAN  1972    

 

 HEPATITIS C SCREENING  1972    

 

 FALL SCREEN  2019    

 

 OSTEOPOROSIS SCREENING  2019    

 

 PNEUMOVAX >=65 (PPSV23)  2019    

 

 PREVNAR >=65 (PCV13)  2019    

 

 FLU VACCINE > 6 MONTHS  2019    

 

 MAMMOGRAM ANNUAL  2019, 2018  



documented as of this encounter



Results

Not on filedocumented in this encounter



Visit Diagnoses







 Diagnosis

 

 Thrombocytopenia - Primary







 Thrombocytopenia, unspecified

 

 Anemia, unspecified type



documented in this encounter



Insurance







 Payer  Benefit Plan  Subscriber ID  Effective  Phone  Address  Type



   / Group    Dates      

 

 BLUE CROSS BLUE  OUT OF STATE  xxxxxxxxxxxxxxx  6/10/2019-Pres    [address]



  PPO



 SHIELD  BCBS - PPO -    ent    [place]  



   [employer]        [id number]  

 

 CARDIOVASCULAR CARE  [employer]  xxxxxxxxxxxxxxx  2018-Prese    [address]
  PPO



 PROVIDERS      [address]    [place] [place]  



           [address]



  



           [place]  



           [address]  









 Guarantor Name  Account Type  Relation to  Date of  Phone  Billing



     Patient  Birth    Address

 

 Closs,Mary Landry  Personal/Family  Self  1955  [phone]  [address]







         (Home)  [place] [address]



documented as of this encounter

## 2020-01-22 NOTE — EDPHYS
Physician Documentation                                                                           

 [facility]                                                                 

Name: Mary Closs                                                                                  

Age: 64 yrs                                                                                       

Sex: Female                                                                                       

: 1955                                                                                   

MRN: [id number]                                                                                   

Arrival Date: 2020                                                                          

Time: 07:41                                                                                       

Account#: [id number]                                                                            

Bed 7                                                                                             

Private MD:                                                                                       

ED Physician Matt Diaz                                                                         

HPI:                                                                                              

                                                                                             

07:59 This 64 yrs old  Female presents to ER via Ambulatory with complaints of       rn  

      Trouble Talking.                                                                            

07:59 The patient presents to the emergency department with confusion, trouble getting words  rn  

      out. Onset: The symptoms/episode began/occurred 2 week(s) ago. Associated signs and         

      symptoms: Pertinent positives: trouble finding words, confusion. Severity of symptoms:      

      At their worst the symptoms were mild in the emergency department the symptoms are          

      unchanged. Current symptoms: Currently, the patient is not experiencing any symptoms.       

      The patient has experienced a previous episode.  reports he has noticed over         

      last 2 weeks she has had memory problems, confusion, and trouble getting words out,         

      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       

      cancer, and had it removed. No seizures. No head injury or trauma. No fever. No             

      illness. .                                                                                  

                                                                                                  

Historical:                                                                                       

- Allergies:                                                                                      

07:50 No Known Allergies;                                                                     iw  

- Home Meds:                                                                                      

07:50 None [Active];                                                                          iw  

- PMHx:                                                                                           

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  

- PSHx:                                                                                           

07:50 gastric sleeve; esophagus; Hysterectomy; tumor removed from brain;                      iw  

                                                                                                  

- Immunization history:: Adult Immunizations up to date.                                          

- Social history:: Smoking status: Patient denies any tobacco usage or history of.                

- Ebola Screening: : Patient negative for fever greater than or equal to 101.5 degrees            

  Fahrenheit, and additional compatible Ebola Virus Disease symptoms Patient denies               

  exposure to infectious person Patient denies travel to an Ebola-affected area in the            

  21 days before illness onset No symptoms or risks identified at this time.                      

- Family history:: not pertinent.                                                                 

- Hospitalizations: : No recent hospitalization is reported.                                      

                                                                                                  

                                                                                                  

ROS:                                                                                              

07:59 Constitutional: Negative for fever, chills, and weight loss, Eyes: Negative for injury, rn  

      pain, redness, and discharge, Neck: Negative for injury, pain, and swelling,                

      Cardiovascular: Negative for chest pain, palpitations, and edema, Respiratory: Negative     

      for shortness of breath, cough, wheezing, and pleuritic chest pain, Abdomen/GI:             

      Negative for abdominal pain, nausea, vomiting, diarrhea, and constipation,                  

      MS/Extremity: Negative for injury and deformity, Skin: Negative for injury, rash, and       

      discoloration, Neuro: Negative for headache, weakness, numbness, tingling, and seizure.     

                                                                                                  

Exam:                                                                                             

07:59 Constitutional:  This is a well developed, well nourished patient who is awake, alert,  rn  

      and in no acute distress.  Ambulatory to room without difficulty or assistance.             

      Head/Face:  Normocephalic, atraumatic. Eyes:  Pupils equal round and reactive to light,     

      extra-ocular motions intact.  Lids and lashes normal.  Conjunctiva and sclera are           

      non-icteric and not injected.  Cornea within normal limits.  Periorbital areas with no      

      swelling, redness, or edema. Cardiovascular:  Regular rate and rhythm.  No pulse            

      deficits. Respiratory:  Lungs have equal breath sounds bilaterally, clear to                

      auscultation.  Abdomen/GI:  soft, non-tender MS/ Extremity:  Pulses equal, no cyanosis.     

       Neurovascular intact.  Full, normal range of motion.  Equal circumference. Neuro:          

      Awake and alert, GCS 15, oriented to person, not place or time.  Cranial nerves II-XII      

      grossly intact.  Motor strength 5/5 in all extremities.  Sensory grossly intact.            

      Cerebellar exam normal.  Normal gait.                                                       

08:08 ECG was reviewed by the Attending Physician.                                            rn  

                                                                                                  

Vital Signs:                                                                                      

07:50  / 69; Pulse 85; Resp 16; Temp 97.5; Pulse Ox 99% on R/A; Pain 0/10;              iw  

08:30  / 66; Pulse 70; Resp 16; Pulse Ox 98% on R/A;                                    em  

09:07  / 63; Pulse 65; Resp 18; Pulse Ox 100% on R/A;                                   em  

                                                                                                  

NIH Stroke Scale Scores:                                                                          

07:55 NIHSS Score: 2                                                                          em  

08:28 NIHSS Score: 2                                                                          rn  

                                                                                                  

MDM:                                                                                              

07:51 Patient medically screened.                                                             rn  

08:23 Data reviewed: vital signs, nurses notes, radiologic studies, CT scan, and as a result, rn  

      I will admit patient. Counseling: I had a detailed discussion with the patient and/or       

      guardian regarding: the historical points, exam findings, and any diagnostic results        

      supporting the discharge/admit diagnosis, the need to transfer to another facility, for     

      higher level of care, [facility] does not immediately have the            

      required specialist. ED course: Pt with left frontal lobe mass and surrounding              

      vasogenic edema with some midline shift, steroids ordered, started transfer to [facility]     

      where she had her care before. .                                                            

08:39 ED course: Accepted for transfer to [facility] neuro ICU..                                rn  

                                                                                                  

                                                                                             

07:59 Order name: CBC with Diff; Complete Time: 08:36                                         rn  

                                                                                             

07:59 Order name: Basic Metabolic Panel                                                       rn  

                                                                                             

07:59 Order name: CT Head Brain wo Cont; Complete Time: 08:26                                 rn  

                                                                                             

07:59 Order name: IV Start; Complete Time: 08:22                                              rn  

                                                                                             

08:01 Order name: EKG - Nurse/Tech; Complete Time: 08:01                                      aa5 

                                                                                                  

EC:08 Rate is 75 beats/min. Rhythm is regular. QRS Axis is Normal. NC interval is normal. QRS rn  

      interval is normal. QT interval is normal. No Q waves. T waves are Normal. No ST            

      changes noted. Clinical impression: Normal ECG. Interpreted by me. Reviewed by me.          

                                                                                                  

Administered Medications:                                                                         

08:23 Drug: Decadron - Dexamethasone 10 mg Route: IVP; Site: right antecubital;               aa5 

09:02 Follow up: Response: No adverse reaction                                                em  

                                                                                                  

                                                                                                  

Disposition:                                                                                      

20 08:40 Transfer ordered to [facility]. Diagnosis are           

  Cerebral edema, Brain mass, recurrent, Altered mental status, unspecified.                      

- Reason for transfer: Higher level of care.                                                      

- Accepting physician is Dr. Martinez.                                                              

- Condition is Stable.                                                                            

- Problem is new.                                                                                 

- Symptoms are unchanged.                                                                         

                                                                                                  

                                                                                                  

                                                                                                  

                                                                                                  

NIH Stroke Scale - NIH Stroke Score                                                               

Date: 2020                                                                                  

Time: 07:55                                                                                       

Total Score = 2                                                                                   

  1a. Level of Consciousness (LOC) - 0(Alert)                                                     

  1b. Level of Consciousness (LOC) (Year \T\ Age) - 1(One)                                        

  1c. LOC Commands (Open \T\ Closes Eyes/) - 0(Both)                                          

   2. Best Gaze (Lateral Gaze Paresis) - 0(Normal)                                                

   3. Visual Field Loss - 0(No visual loss)                                                       

   4. Facial Palsy - 0(Normal)                                                                    

  5a. Left Arm: Motor (10-second hold) - 0(No drift)                                              

  5b. Right Arm: Motor (10-second hold) - 0(No drift)                                             

  6a. Left Leg: Motor (5-second hold - always test supine) - 0(No drift)                          

  6b. Right Leg: Motor (5-second hold - always test supine) - 0(No drift)                         

   7. Limb Ataxia (finger/nose \T\ heel/shin - test with eyes open) - 0(Absent)                   

   8. Sensory Loss (pinprick arms/legs/face) - 0(Normal)                                          

   9. Best Language: Aphasia (description/naming/reading) - 0(No aphasia)                         

  10. Dysarthria (speech clarity - read or repeat words) - 1(Mild to Moderate)                    

  11. Extinction and Inattention (visual/tactile/auditory/spatial/personal) - 0(No                

      abnormality)                                                                                

Initials: akanksha                                                                                      

                                                                                                  

                                                                                                  

NIH Stroke Scale - NIH Stroke Score                                                               

Date: 2020                                                                                  

Time: 08:28                                                                                       

Total Score = 2                                                                                   

  1a. Level of Consciousness (LOC) - 0(Alert)                                                     

  1b. Level of Consciousness (LOC) (Year \T\ Age) - 1(One)                                        

  1c. LOC Commands (Open \T\ Closes Eyes/) - 0(Both)                                          

   2. Best Gaze (Lateral Gaze Paresis) - 0(Normal)                                                

   3. Visual Field Loss - 0(No visual loss)                                                       

   4. Facial Palsy - 0(Normal)                                                                    

  5a. Left Arm: Motor (10-second hold) - 0(No drift)                                              

  5b. Right Arm: Motor (10-second hold) - 0(No drift)                                             

  6a. Left Leg: Motor (5-second hold - always test supine) - 0(No drift)                          

  6b. Right Leg: Motor (5-second hold - always test supine) - 0(No drift)                         

   7. Limb Ataxia (finger/nose \T\ heel/shin - test with eyes open) - 0(Absent)                   

   8. Sensory Loss (pinprick arms/legs/face) - 0(Normal)                                          

   9. Best Language: Aphasia (description/naming/reading) - 0(No aphasia)                         

  10. Dysarthria (speech clarity - read or repeat words) - 1(Mild to Moderate)                    

  11. Extinction and Inattention (visual/tactile/auditory/spatial/personal) - 0(No                

      abnormality)                                                                                

Initials: rn                                                                                      

                                                                                                  

Signatures:                                                                                       

Dispatcher [employer]                           Anatoliy Taylor RN RN em Williams, Irene, RN RN iw Nieto, Roman, MD MD rn Calderon, Audri, DALIA GÓMEZ   aa5                                                  

                                                                                                  

Corrections: (The following items were deleted from the chart)                                    

09:26 08:40 2020 08:40 Transfer ordered to [facility].      em          

      Diagnosis is Cerebral edema; Brain mass, recurrent; Altered mental status,                  

      unspecified. Reason for transfer: Higher level of care. Accepting physician is              

      Dr. Martinez. Condition is Stable. Problem is new. Symptoms are unchanged. rn                 

                                                                                                  

**************************************************************************************************

## 2020-01-22 NOTE — ER
Nurse's Notes                                                                                     

 [facility]                                                                 

Name: Mary Closs                                                                                  

Age: 64 yrs                                                                                       

Sex: Female                                                                                       

: 1955                                                                                   

MRN: [id number]                                                                                   

Arrival Date: 2020                                                                          

Time: 07:41                                                                                       

Account#: [id number]                                                                            

Bed 7                                                                                             

Private MD:                                                                                       

Diagnosis: Cerebral edema;Brain mass, recurrent;Altered mental status, unspecified                

                                                                                                  

Presentation:                                                                                     

                                                                                             

07:47 Presenting complaint: Significant other states: hx of cancerous tumor removed from      iw  

      brain X 1 year ago, coworkers noticing pt seems more forgetful and has a hard time          

      getting her words out X 2 weeks, similar symptom as when she had the tumor. Transition      

      of care: patient was not received from another setting of care. No acute neurological       

      deficit is noted. Pre-hospital glucose is not applicable to this patient. Onset of          

      symptoms was 2020. Risk Assessment: Do you want to hurt yourself or someone     

      else? Patient reports no desire to harm self or others. Initial Sepsis Screen: Does the     

      patient meet any 2 criteria? No. Patient's initial sepsis screen is negative. Does the      

      patient have a suspected source of infection? No. Patient's initial sepsis screen is        

      negative. Care prior to arrival: None.                                                      

07:47 Method Of Arrival: Ambulatory                                                           iw  

07:47 Acuity: COREY 2                                                                           iw  

                                                                                                  

Stroke Activation: Symptom onset > 6 hours                                                        

 Physician: Stroke Attending; Name: ; Notified At: ; Arrived At:                                  

 Physician: Chief Stroke Resident; Name: ; Notified At: ; Arrived At:                             

 Physician: Stroke Resident; Name: ; Notified At: ; Arrived At:                                   

 Physician: ED Attending; Name: ; Notified At: ; Arrived At:                                      

 Physician: ED Resident; Name: ; Notified At: ; Arrived At:                                       

                                                                                                  

Historical:                                                                                       

- Allergies:                                                                                      

07:50 No Known Allergies;                                                                     iw  

- Home Meds:                                                                                      

07:50 None [Active];                                                                          iw  

- PMHx:                                                                                           

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  

- PSHx:                                                                                           

07:50 gastric sleeve; esophagus; Hysterectomy; tumor removed from brain;                      iw  

                                                                                                  

- Immunization history:: Adult Immunizations up to date.                                          

- Social history:: Smoking status: Patient denies any tobacco usage or history of.                

- Ebola Screening: : Patient negative for fever greater than or equal to 101.5 degrees            

  Fahrenheit, and additional compatible Ebola Virus Disease symptoms Patient denies               

  exposure to infectious person Patient denies travel to an Ebola-affected area in the            

  21 days before illness onset No symptoms or risks identified at this time.                      

- Family history:: not pertinent.                                                                 

- Hospitalizations: : No recent hospitalization is reported.                                      

                                                                                                  

                                                                                                  

Screenin:59 Abuse screen: Denies threats or abuse. Nutritional screening: No deficits noted.        em  

      Tuberculosis screening: No symptoms or risk factors identified. Fall Risk None              

      identified.                                                                                 

                                                                                                  

Assessment:                                                                                       

07:53 Reassessment: Dr Diaz at the bedside.                                                  sv  

07:55 General: Appears in no apparent distress. comfortable, Behavior is calm, cooperative,   em  

      Denies fever. Pain: Denies pain. Neuro: Level of Consciousness is awake, alert, obeys       

      commands, Oriented to person,  are equal bilaterally Moves all extremities. Gait       

      is steady, Speech is normal, Facial symmetry appears normal, Pupils are PERRLA, Intact      

      Reports  reports being more forgetful than usual for several weeks, hx of brain      

      tumor. Cardiovascular: Capillary refill < 3 seconds Patient's skin is warm and dry.         

      Respiratory: Airway is patent Respiratory effort is even, unlabored, Respiratory            

      pattern is regular, symmetrical. GI: Patient currently denies nausea, vomiting. :         

      Denies burning with urination. Derm: Skin is intact, is thin, Skin is pink, warm \T\ dry.   

      Musculoskeletal: Capillary refill < 3 seconds, Range of motion: intact in all               

      extremities.                                                                                

08:00 T-PA (Activase) Screening: Contraindications: Other: neoplasm.                          em  

08:00 VAN Scoring: Arm Drift: Patients demonstrates NO arm weakness. Patient is VAN Negative. em  

      Visual Disturbance: No visual disturbance noted. Aphasia: Expressive aphasia noted.         

      Provider notified of +VAN scoring.                                                          

08:09 Reassessment: Patient appears in no apparent distress at this time. wheeled to CT via   em  

      wheelchair.                                                                                 

08:14 Reassessment: Pt back from CT.                                                          sv  

08:33 Patient has been NPO before screening. The patient is alert, and able to follow         em  

      commands. The patient does not exhibit slurred or garbled speech. The patient is not        

      exhibiting difficulty speaking. The patient does not exhibit difficulty understanding       

      words. The patient is able to swallow own secretions with no drooling or need for           

      suction. Patient tolerated one teaspoon of water. No drooling, immediate coughing,          

      gurgling, or clearing of the throat was noted. The patient tolerated 90mL of water. No      

      drooling, immediate coughing, gurgling, or clearing of the throat was noted. The            

      patient passed the bedside swallow screening. Oral medications may be given as ordered.     

      Contact Physician for further diet orders. Provider notified of bedside swallow             

      screening results: Matt Diaz MD.                                                          

09:04 Reassessment: report given to DALIA Butler at [facility], pending life flight.    em  

                                                                                                  

Vital Signs:                                                                                      

07:50  / 69; Pulse 85; Resp 16; Temp 97.5; Pulse Ox 99% on R/A; Pain 0/10;              iw  

08:30  / 66; Pulse 70; Resp 16; Pulse Ox 98% on R/A;                                    em  

09:07  / 63; Pulse 65; Resp 18; Pulse Ox 100% on R/A;                                   em  

                                                                                                  

NIH Stroke Scale Scores:                                                                          

07:55 NIHSS Score: 2                                                                          em  

08:28 NIHSS Score: 2                                                                          rn  

                                                                                                  

ED Course:                                                                                        

07:41 Patient arrived in ED.                                                                  [id number] 

07:49 Triage completed.                                                                       iw  

07:50 Arm band placed on.                                                                     iw  

07:51 Matt Diaz MD is Attending Physician.                                                rn  

07:59 Anatoliy Rodriguez RN is Primary Nurse.                                                      em  

07:59 Patient has correct armband on for positive identification. Placed in gown. Bed in low  em  

      position. Call light in reach. Side rails up X2. Adult w/ patient. Pulse ox on. NIBP on.    

08:12 CT Head Brain wo Cont In Process Unspecified.                                           EDMS

08:18 Initial lab(s) drawn, by me, sent to lab. Inserted saline lock: 22 gauge in right       em  

      antecubital area, using aseptic technique. Blood collected.                                 

09:19 No provider procedures requiring assistance completed. Patient transferred, IV remains  em  

      in place.                                                                                   

                                                                                                  

Administered Medications:                                                                         

08:23 Drug: Decadron - Dexamethasone 10 mg Route: IVP; Site: right antecubital;               aa5 

09:02 Follow up: Response: No adverse reaction                                                em  

                                                                                                  

                                                                                                  

Outcome:                                                                                          

08:40 ER care complete, transfer ordered by MD.                                               rn  

09:19 Transferred by helicopter to [facility], Transfer form completed.    em  

      X-rays sent w/ patient.                                                                     

09:19 Condition: good                                                                             

09:19 Instructed on the need for transfer, Demonstrated understanding of instructions.            

09:26 Patient left the ED.                                                                    em  

                                                                                                  

                                                                                                  

NIH Stroke Scale - NIH Stroke Score                                                               

Date: 2020                                                                                  

Time: 07:55                                                                                       

Total Score = 2                                                                                   

  1a. Level of Consciousness (LOC) - 0(Alert)                                                     

  1b. Level of Consciousness (LOC) (Year \T\ Age) - 1(One)                                        

  1c. LOC Commands (Open \T\ Closes Eyes/) - 0(Both)                                          

   2. Best Gaze (Lateral Gaze Paresis) - 0(Normal)                                                

   3. Visual Field Loss - 0(No visual loss)                                                       

   4. Facial Palsy - 0(Normal)                                                                    

  5a. Left Arm: Motor (10-second hold) - 0(No drift)                                              

  5b. Right Arm: Motor (10-second hold) - 0(No drift)                                             

  6a. Left Leg: Motor (5-second hold - always test supine) - 0(No drift)                          

  6b. Right Leg: Motor (5-second hold - always test supine) - 0(No drift)                         

   7. Limb Ataxia (finger/nose \T\ heel/shin - test with eyes open) - 0(Absent)                   

   8. Sensory Loss (pinprick arms/legs/face) - 0(Normal)                                          

   9. Best Language: Aphasia (description/naming/reading) - 0(No aphasia)                         

  10. Dysarthria (speech clarity - read or repeat words) - 1(Mild to Moderate)                    

  11. Extinction and Inattention (visual/tactile/auditory/spatial/personal) - 0(No                

      abnormality)                                                                                

Initials:                                                                                       

                                                                                                  

                                                                                                  

NIH Stroke Scale - NIH Stroke Score                                                               

Date: 2020                                                                                  

Time: 08:28                                                                                       

Total Score = 2                                                                                   

  1a. Level of Consciousness (LOC) - 0(Alert)                                                     

  1b. Level of Consciousness (LOC) (Year \T\ Age) - 1(One)                                        

  1c. LOC Commands (Open \T\ Closes Eyes/) - 0(Both)                                          

   2. Best Gaze (Lateral Gaze Paresis) - 0(Normal)                                                

   3. Visual Field Loss - 0(No visual loss)                                                       

   4. Facial Palsy - 0(Normal)                                                                    

  5a. Left Arm: Motor (10-second hold) - 0(No drift)                                              

  5b. Right Arm: Motor (10-second hold) - 0(No drift)                                             

  6a. Left Leg: Motor (5-second hold - always test supine) - 0(No drift)                          

  6b. Right Leg: Motor (5-second hold - always test supine) - 0(No drift)                         

   7. Limb Ataxia (finger/nose \T\ heel/shin - test with eyes open) - 0(Absent)                   

   8. Sensory Loss (pinprick arms/legs/face) - 0(Normal)                                          

   9. Best Language: Aphasia (description/naming/reading) - 0(No aphasia)                         

  10. Dysarthria (speech clarity - read or repeat words) - 1(Mild to Moderate)                    

  11. Extinction and Inattention (visual/tactile/auditory/spatial/personal) - 0(No                

      abnormality)                                                                                

Initials: rn                                                                                      

                                                                                                  

Signatures:                                                                                       

Dispatcher [employer]                           Patrica Crespo, RN                    Anatoliy Daly, RN                        Delfina Goddard, Matt Vazquez RN, MD MD rn Calderon, Audri, RN                     Chica [email or web address]                                                  

                                                                                                  

**************************************************************************************************

## 2020-01-22 NOTE — XMS REPORT
Summary of Care

 Created on:2019



Patient:Closs, Mary Landry

Sex:Female

:1955

External Reference #:[id number]





Demographics







 Address  [address]



   [place] [address]

 

 Home Phone  [phone]

 

 Mobile Phone  [phone]

 

 Phone  [phone]

 

 Email Address  [email or web address]

 

 Preferred Language  English

 

 Marital Status  Single

 

 [employer]beterian Affiliation  Unknown

 

 Race  White

 

 Ethnic Group  Not  or 









Author







 Organization  [facility]

 

 [place] [address]









Support







 Name  Relationship  Address  Phone

 

 Chloe Allen  Sister  Unavailable  Unavailable

 

 Melvin Ponce  Unavailable  [address]  [phone]



     [place] [address]  









Care Team Providers







 Name  Role  Phone

 

 Unavailable  Primary Care Provider  Unavailable









Reason for Visit







 Reason  Comments

 

 Follow Up  6 week f/u







Encounter Details







 Date  Type  Department  Care Team  Description

 

 2019  Office Visit  [facility] Kenan Farnsworth  Follow Up  (6 week



     [facility] Rene Osman MD



  f/u)



     [facility]  [address]



  



     [place] [address] [address]



  [phone]



  



     [address]th Floor, [address]



  [phone]  



     [place] [address]



  (Fax)  



     [phone]    







Allergies

No Known Allergiesdocumented as of this encounter (statuses as of 2019)



Medications







 Medication  Sig  Dispensed  Refills  Start Date  End Date  Status

 

 Lansoprazole (PREVACID  Take  by mouth.    0      Active



 OR)            

 

 Fe Cbn-Fe  Take 1 tablet by  30 Each  3  2019    Active



 Gluc-FA-B12-C-DSS  mouth daily.          



 (FERRALET 90) 90-1 MG            



 TABS            



documented as of this encounter (statuses as of 2019)



Active Problems







 Problem  Noted Date

 

 Endometrial cancer (HCCode)  2018



documented as of this encounter (statuses as of 2019)



Social History







 Tobacco Use  Types  Packs/Day  Years Used  Date

 

 Never Smoker        









 Smokeless Tobacco: Never Used      









 Alcohol Use  Drinks/Week  oz/Week  Comments

 

 No      









 Alcohol Habits  Answer  Date Recorded

 

 How often do you have a drink containing alcohol?  Never  2018

 

 How many drinks containing alcohol do you have on a typical  Not asked  



 day when you are drinking?    

 

 How often do you have six or more drinks on one occasion?  Not asked  









 Sex Assigned at Birth  Date Recorded

 

 Not on file  









 Job Start Date  Occupation  Industry

 

 Not on file  Not on file  Not on file









 Travel History  Travel Start  Travel End









 No recent travel history available.



documented as of this encounter



Last Filed Vital Signs







 Vital Sign  Reading  Time Taken  Comments

 

 Blood Pressure  125/77  2019  2:35 PM CDT  

 

 Pulse  77  2019  2:35 PM CDT  

 

 Temperature  36.7 C (98.1 F)  2019  2:35 PM CDT  

 

 Respiratory Rate  -  -  

 

 Oxygen Saturation  -  -  

 

 Inhaled Oxygen Concentration  -  -  

 

 Weight  99.8 kg (220 lb)  2019  2:35 PM CDT  

 

 Height  154.9 cm (5' 1")  2019  2:35 PM CDT  

 

 Body Mass Index  41.57  2019  2:35 PM CDT  



documented in this encounter



Progress Notes

Kenan Yeboah MD - 2019  3:00 PM CDTFormatting of this note 
might be different from the original.

                     Hematology Consultation Note



HPI: Ms.Closs is 64 y.o. female presents today for evaluation of 
thrombocytopenia.Hx of Stage IVB high grade endometrial/serous carcinoma s/p 
NACT, craniotomy and SBRT to brain, and interval debulking surgery with EL/RON/
BSO/PLND. She received total of cycle 6 chemotherapy.



In 2018,she developed acute encephalopathy (her  noted 
progressively worsening word-finding difficulties and an inability to finish 
her sentences) and was diagnosed with a brain and uterine mass at [facility] in 
[place]. Was later transferred to [facility],brain mass was resected 
which was followed by SBRT to brain. This was followed by interval debulking 
surgery with EL/RON/BSO/PLND. So far, she received cycle 6 chemotherapy and 
plan was to complete 3 more. However, she developed thrombocytopenia (platelet'
s in the 70s) and further chemo sessions were held until hemology evaluation by 
. Last chemo session: 19.



Denies shortness of breath, dyspnea, dizziness today. No gum/nose bleeds. 
Denies melena, hematochezia or hematuria. Denies headaches.



During this visit, 19, she comes with CBC that showed platelet=77K during 
last visit - however,kutfd=916 K. She reports that she feels ok



2019 2019 2019 2019 2019 2019 2019 2018 
10/17/2018 10/16/334102/ 10/15/2018 10/12/2018 

9.6 (L) 7.5 (L) 8.5 (L) 9.0 (L) 8.3 (L) 8.2 (L)  8.9 (L) 9.1 (L) 8.8 (L) 8.4 (
L) 9.8 (L) 10.7 (L)

31.5 (L) 24.9 (L) 27.8 (L) 26.0 (L) 28.8 (L)   30.9 (L) 30.1 (L) 29.1 (L) 
28.2 (L) 33.3 (L) 36.0

4.4 4.5 5.8  3.1 (L)   7.4   14.2 (H) 9.9 5.7

4.06 3.25 (L) 3.66 (L)  3.86 (L)   4.14   3.79 (L) 4.48 4.95

77.6 (L) 76.6 (L) 76.0 (L)  74.6 (L)   74.6 (L)   74.4 (L) 74.3 (L) 
72.7 (L)

23.6 (L) 23.1 (L) 23.2 (L)  21.5 (L)   21.5 (L)   22.2 (L) 21.9 (L) 
21.6 (L)

30.5 (L) 30.1 (L) 30.6 (L)  28.8 (L)   28.8 (L)   29.8 (L) 29.4 (L) 
29.7 (L)

21.1 (H) 21.3 (H) 20.8 (H)  22.5 (H)   19.9 (H)     24.4 (H)

153 126 (L) 118 (L)  149 (L)  109 (L) 363   220 226 230

9.6 9.8 9.9  10.4   9.0 (L)   10.8 11.6 10.3

0 0 0  0   0   0  





PMH:

 Uterine Adenocarcinoma

 Atypical lobular hyperplasia (ALH) of left breast

 Brain mass



FH:

Mother: Alive, Dementia

Father: , CHF



SH:

No tobacco use

Etoh use: Rarely



Social hx:

2 Boys

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



Surgical hx:

Tonsillectomy 

 , 

Vertical Sleeve surgery- with esophogeal repair.

HX APPENDECTOMY

 HX CRANIOTOMY 10/2018

[facility]

 HX HYSTERECTOMY

EL/RON/BSO/BLPLND interval debulkt for IVB endometrial cancer

 HX LAP BAND

 HX RON AND BSO 2019

s/p exlap, RON, BSO, BPLND, optimal TRS at [facility]

 HX TONSILLECTOMY



Review of Systems

Constitutional: Positive for malaise/fatigue. Negative for fever and weight 
loss.

HENT: Negative for nosebleeds.

Eyes: Negative for blurred vision and double vision.

Respiratory: Negative for cough and shortness of breath.

Cardiovascular: Negative for chest pain, palpitations and leg swelling.

Gastrointestinal: Negative for abdominal pain, blood in stool, constipation, 
diarrhea, heartburn, nausea and vomiting.

Musculoskeletal: Positive for myalgias.

Skin: Negative.

Neurological: Negative for dizziness and headaches.

Endo/Heme/Allergies: Negative for environmental allergies. Does not bruise/
bleed easily.

Psychiatric/Behavioral: Negative for depression. The patient does not have 
insomnia.





No Known Allergies





Outpatient Medications Prior to Visit

Medication Sig Dispense Refill

 Lansoprazole (PREVACID OR) Take  by mouth.  



No facility-administered medications prior to visit.





FamilyHistory



Family History

Problem Relation Name Age of Onset

 Unremarkable Mother  

 Dementia Mother  

 Other  (Congestive Heart Failure) Mother  

 Unremarkable Father  

 Heart Problems Father  

 Heart Failure Sister  

 Other  (Juvenile Diabetes) Sister  







Social History

  Socioeconomic History

    Marital status: Single

    Spouse name: Not on file

    Number of children: Not on file

    Years of education: Not on file

    Highest education level: Not on file

  Occupational History

    Not on file

  Social Needs

    Financial resource strain: Not on file

    Food insecurity:

      Worry: Not on file

      Inability: Not on file

    Transportation needs:

      Medical: Not on file

      Non-medical: Not on file

  Tobacco Use

    Smoking status: Never Smoker

    Smokeless tobacco: Never Used

  Substance and Sexual Activity

    Alcohol use: No

      Frequency: Never

    Drug use: No

    Sexual activity: Not on file

  Lifestyle

    Physical activity:

      Days per week: Not on file

      Minutes per session: Not on file

    Stress: Not on file

  Relationships

    Social connections:

      Talks on phone: Not on file

      Gets together: Not on file

      Attends Gnosticist service: Not on file

      Active member of club or organization: Not on file

      Attends meetings of clubs or organizations: Not on file

      Relationship status: Not on file

    Intimate partner violence:

      Fear of current or ex partner: Not on file

      Emotionally abused: Not on file

      Physically abused: Not on file

      Forced sexual activity: Not on file

  Other Topics

    Concerns:

      Not on file

  Social History Narrative

    Not on file





PastSurgicalHistory

Past Surgical History:

Procedure Laterality Date

 HX  SECTION  

 HX CRANIOTOMY  

 HX LAPAROSCOPIC SLEEVE GASTRECTOMY  

 HX TONSILLECTOMY  







ECO



Vital Signs

Height: 5' 1" (154.9 cm)

Weight - Scale: 220 lb (99.8 kg)

Temp: 98.1 F (36.7 C)

Temp Source: Oral

Pulse: 77

Resting Heart Rate: 77

BP: 125/77

Patient Position: Sitting

Cuff Size: regular

BP Location: left arm

Oxygen Therapy

O2 Sat: 100 %

O2 Flow Rate: Room Air

Height and Weight

BSA (Calculated - sq m): 2.07 sq meters

BMI (Calculated): 41.7

Predicted Body Weight: 105.38







General: Alert, well appearing, no acute distress.

Abdomen: Bowel sounds are normoactive. S/NT/ND. No palpable masses or 
organomegaly, well healed vertical abdominal incision noted

Eyes : Conjunctivae and lids normal. The sclera is clear and anicteric

Heart : Regular rate and rhythm, normal S1 and S2. No murmurs, gallops, or 
rubs. No extra sounds.

Lungs : Normal respiratory effort. There is normal air entry with normal breath 
sounds. No rales, rhonchi, rubs or wheezes.

Extr: No varicosities or ulceration. No clubbing, cyanosis, or edema.

Skin : Warm and dry No rashes, lesions or ulcerations.

Head : Normocephalic, atraumatic, healed surgical scar noted

ENT : No oral lesions. Oropharynx shows no mucositis, erythema or exudate

Neck : Supple. No cervical adenopathy.

Nodes : No axillary, supraclavicular, or cervical lymphadenopathy.

Neurologic: The patient is oriented to time, place, and person. Muscle tone is 
normal.The gait was normal and there were no difficulties in coordination.

Psych : Appropriate affect and behavior. Thought content is normal.



Results for CLOSS, MARY LANDRY (MRN [id number]) as of 2019 16:36

 Ref. Range 2019 15:22

GLUCOSE Latest Ref Range: 70 - 99 MG/DL 87

BLOOD UREA NITROGEN Latest Ref Range: 8 - 23 MG/DL 14

CREATININE Latest Ref Range: 0.60 - 1.30 MG/DL 0.78

BUN/CREAT RATIO Latest Ref Range: 6 - 28 RATIO 18

SODIUM Latest Ref Range: 133 - 146 MEQ/L 143

POTASSIUM Latest Ref Range: 3.5 - 5.4 MEQ/L 4.1

CHLORIDE Latest Ref Range: 100 - 112 MEQ/L 106

CO2 Latest Ref Range: 21 - 30 MEQ/L 26

CALCIUM Latest Ref Range: 8.5 - 10.5 MG/DL 9.5

PROTEIN TOTAL Latest Ref Range: 6.1 - 8.1 G/DL 7.4

ALBUMIN Latest Ref Range: 3.4 - 4.8 G/DL 4.3

GLOBULINS, SERUM, TOTAL Latest Ref Range: 1.9 - 3.7 G/DL 3.1

A/G RATIO Latest Ref Range: 1.0 - 2.6 RATIO 1.4

BILIRUBIN TOTAL Latest Ref Range: &lt;=1.2 MG/DL 0.5

ALKALINE PHOSPHATASE Latest Ref Range: 30 - 132 U/L 119

AST (SGOT) Latest Ref Range: 7 - 56 U/L 16

ALT (SGPT) Latest Ref Range: 3 - 47 U/L 11

RBC Latest Ref Range: 3.80 - 5.10 M/UL 4.17

WBC Latest Ref Range: 4.0 - 11.0 K/UL 4.5

HEMOGLOBIN Latest Ref Range: 11.5 - 15.5 G/DL 12.3

HEMATOCRIT Latest Ref Range: 34.0 - 45.0 % 37.2

MEAN CORPUSCULAR VOLUME Latest Ref Range: 80.0 - 100.0 fL 89.2

MEAN CORPUSCULAR HEMOGLOBIN Latest Ref Range: 27.0 - 34.0 PG 29.5

MEAN CORPUSCULAR HEMOGLOBIN CONC Latest Ref Range: 32.0 - 35.5 G/DL 33.1

RED CELL DISTRIBUTION WIDTH Latest Ref Range: 11.0 - 15.0 % 14.0

PLATELET COUNT Latest Ref Range: 130 - 400 K/ (L)

NEUTROPHILS % Latest Ref Range: 40.0 - 74.0 % 57

LYMPHOCYTES % Latest Ref Range: 19.0 - 48.0 % 29

MONOCYTES % Latest Ref Range: 4.0 - 13.0 % 11

BASOPHILS % Latest Ref Range: 0.0 - 2.0 % 0

EOSINOPHILS % Latest Ref Range: 0.0 - 7.0 % 3





Results for CLOSS, MARY LANDRY (MRN [id number]) as of 2019 14:40

 Ref. Range 2019 16:27

FIBRINOGEN LEVEL Latest Ref Range: 205 - 395 MG/ (H)

PROTIME MECHANICAL Latest Ref Range: 12.5 - 14.7 SECONDS 12.5

INR Latest Ref Range: SEE BELOW  0.9

PTT Latest Ref Range: 25.2 - 40.0 SECONDS 27.8

Results for CLOSS, MARY LANDRY (MRN [id number]) as of 2019 14:40

 Ref. Range 3/14/2019 09:21 2019 14:42

RBC Latest Ref Range: 3.80 - 5.10 M/UL 4.38 4.06

WBC Latest Ref Range: 4.0 - 11.0 K/UL 3.8 (L) 4.2

HEMOGLOBIN Latest Ref Range: 11.5 - 15.5 G/DL 10.9 (L) 12.1

HEMATOCRIT Latest Ref Range: 34.0 - 45.0 % 34.7 36.3

MEAN CORPUSCULAR VOLUME Latest Ref Range: 80.0 - 100.0 fL 79.2 (L) 89.4

MEAN CORPUSCULAR HEMOGLOBIN Latest Ref Range: 27.0 - 34.0 PG 24.9 (L) 29.8

MEAN CORPUSCULAR HEMOGLOBIN CONC Latest Ref Range: 32.0 - 35.5 G/DL 31.4 (L) 
33.3

RED CELL DISTRIBUTION WIDTH Latest Ref Range: 11.0 - 15.0 % 20.5 (H) 13.5

PLATELET COUNT Latest Ref Range: 130 - 400 K/ (L) 77 (L)

NEUTROPHILS % Latest Ref Range: 40.0 - 74.0 % 60 58

LYMPHOCYTES % Latest Ref Range: 19.0 - 48.0 % 23 30

MONOCYTES % Latest Ref Range: 4.0 - 13.0 % 11 9

BASOPHILS % Latest Ref Range: 0.0 - 2.0 % 1 1

EOSINOPHILS % Latest Ref Range: 0.0 - 7.0 % 6 2

NEUTROPHILS ABSOLUTE COUNT Latest Ref Range: 1.50 - 7.00 K/UL 2.28



Assessment and Plan:Ms.Closs is 64 y.o. female presents today for evaluation of 
thrombocytopenia.Hx of Stage IVB high grade endometrial/serous carcinoma s/p 
NACT, craniotomy and SBRT to brain, and interval debulking surgery with EL/RON/
BSO/PLND.She received cycle 6 chemotherapy.Presents today for evaluation of 
thrombocytopenia.



#Thrombocytopenia likely secondary to chemo? (Carboplatin+Taxol)

-Will check peripheral smear today

-Check Fibrinogen, PT/INR, PTT to r/o DIC.



During this visit, 19, she comes with CBC that showed platelet=77K.  I 
will be reviewing her smear today. however, flnzy=867 K. She reports that she 
feels ok - her thrombocytopenia is  Connectedwith chemotherapy



#Microcytosis

Ferritin was 26- B12 normal.





Patient's plan of care reviewed with Dr. Yeboah





Follow up in 12 weeks with CBC, CMP prior to next visit





Electronically signed by Kenan Yeboah MD at 2019  4:41 PM 
CDTdocumented in this encounter



Plan of Treatment







 Date  Type  Specialty  Care Team  Description

 

 10/16/2019  Appointment  Infusion Center  [facility], [facility]



  



       [address]



  



       [address]th Floor, [address]



  



       [place] [address]



  



       [phone]  









 Name  Type  Priority  Associated Diagnoses  Order Schedule

 

 CBC W/AUTO DIFF WITH  Lab  Routine  Thrombocytopenia (HCCode)  Expected:



 PLATELETS        2019, Expires:



         2020

 

 COMPREHENSIVE METABOLIC  Lab  Routine  Thrombocytopenia (HCCode)  Expected:



 PANEL        2019, Expires:



         2020









 Health Maintenance  Due Date  Last Done  Comments

 

 COLON CANCER SCREENING: COLONOSCOPY  1955    

 

 TETANUS SHOT (ADULT)  1969    

 

 BMI FOLLOW UP PLAN  1972    

 

 HEPATITIS C SCREENING  1972    

 

 FALL SCREEN  2019    

 

 OSTEOPOROSIS SCREENING  2019    

 

 PNEUMOVAX >=65 (PPSV23)  2019    

 

 PREVNAR >=65 (PCV13)  2019    

 

 FLU VACCINE > 6 MONTHS  2019    

 

 MAMMOGRAM ANNUAL  2019, 2018  



documented as of this encounter



Procedures







 Procedure Name  Priority  Date/Time  Associated Diagnosis  Comments

 

 CBC W/AUTO DIFF WITH  STAT  2019  3:22  Thrombocytopenia  Results for 
this



 PLATELETS    PM CDT  (HCCode)  procedure are in



         the results



         section.

 

 COMPREHENSIVE  STAT  2019  3:22  Thrombocytopenia  Results for this



 METABOLIC PANEL    PM CDT  (HCCode)  procedure are in



         the results



         section.



documented in this encounter



Results

COMPREHENSIVE METABOLIC PANEL (2019  3:22 PM CDT)





 Component  Value  Ref Range  Performed At  Pathologist



         [facility]

 

 GLUCOSE  87  70 - 99 MG/DL  CPL  

 

 BLOOD UREA NITROGEN  14  8 - 23 MG/DL  CPL  

 

 CREATININE  0.78  0.60 - 1.30  CPL  



     MG/DL    

 

 EGFR AA  92  >60  CPL  



     ML/MIN/1.73    

 

 EGFR  80  >60  CPL  



     ML/MIN/1.73    

 

 BUN/CREAT RATIO  18  6 - 28 RATIO  CPL  

 

 SODIUM  143  133 - 146  CPL  



     MEQ/L    

 

 POTASSIUM  4.1  3.5 - 5.4  CPL  



     MEQ/L    

 

 CHLORIDE  106  100 - 112  CPL  



     MEQ/L    

 

 CO2  26  21 - 30 MEQ/L  CPL  

 

 CALCIUM  9.5  8.5 - 10.5  CPL  



     MG/DL    

 

 PROTEIN TOTAL  7.4  6.1 - 8.1  CPL  



     G/DL    

 

 ALBUMIN  4.3  3.4 - 4.8  CPL  



     G/DL    

 

 GLOBULINS, SERUM,  3.1  1.9 - 3.7  CPL  



 TOTAL    G/DL    

 

 A/G RATIO  1.4  1.0 - 2.6  CPL  



     RATIO    

 

 BILIRUBIN TOTAL  0.5  <=1.2 MG/DL  CPL  

 

 ALKALINE  119  30 - 132 U/L  CPL  



 PHOSPHATASE        

 

 AST (SGOT)  16  7 - 56 U/L  CPL  

 

 ALT (SGPT)  11  3 - 47 U/L  CPL  



   Comment:      



         



    TESTING PERFORMED AT [facility]      



   [address] [place] [address]  
    



   CLIA NO. [id number]      



         



   Unless Otherwise Indicated, All Testing Performed At:      



   [facility], [address] [place] [address] 
[address]      



   : Kenji Andino M.D.      



   CLIA Number [id number] [place]. [id number]    
  



         









 Specimen

 

 Blood









 Performing Organization  Address  City/State/Zipcode  Phone Number

 

 [address] [address] [phone]



CBC W/AUTO DIFF WITH PLATELETS (2019  3:22 PM CDT)





 Component  Value  Ref Range  Performed At  Pathologist



         Signature

 

 WHITE BLOOD CELL  4.5  4.0 - 11.0  CPL  



 COUNT    K/UL    

 

 RED BLOOD CELL COUNT  4.17  3.80 - 5.10  CPL  



     M/UL    

 

 HEMOGLOBIN  12.3  11.5 - 15.5  CPL  



     G/DL    

 

 HEMATOCRIT  37.2  34.0 - 45.0 %  CPL  

 

 MEAN CORPUSCULAR  89.2  80.0 - 100.0  CPL  



 VOLUME    fL    

 

 MEAN CORPUSCULAR  29.5  27.0 - 34.0  CPL  



 HEMOGLOBIN    PG    

 

 MEAN CORPUSCULAR  33.1  32.0 - 35.5  CPL  



 HEMOGLOBIN CONC    G/DL    

 

 RED CELL  14.0  11.0 - 15.0 %  CPL  



 DISTRIBUTION WIDTH        

 

 NEUTROPHILS %  57  40.0 - 74.0 %  CPL  

 

 LYMPHOCYTES %  29  19.0 - 48.0 %  CPL  

 

 MONOCYTES %  11  4.0 - 13.0 %  CPL  

 

 EOSINOPHILS %  3  0.0 - 7.0 %  CPL  

 

 BASOPHILS %  0  0.0 - 2.0 %  CPL  

 

 PLATELET COUNT  102 (L)  130 - 400  CPL  



   Comment:  K/UL    



         



    TESTING PERFORMED AT [facility]      



   [address] [place], [place] [id number]  
    



   CLIA NO. [id number]      



         



   Unless Otherwise Indicated, All Testing Performed At:      



   [facility], [address] [place] [address]      



   : Kenji Andino M.D.      



   CLIA Number [id number] [place]. [id number]    
  



         









 Specimen

 

 Blood









 Performing Organization  Address  City/State/Zipcode  Phone Number

 

 [address] [address]  [phone]



documented in this encounter



Visit Diagnoses







 Diagnosis

 

 Thrombocytopenia (HCCode) - Primary







 Thrombocytopenia, unspecified



documented in this encounter



Insurance







 Payer  Benefit Plan /  Subscriber ID  Effective Dates  Phone  Address  Type



   Group          

 

 [employer]  OUT OF STATE  xxxxxxxxxxxxxxx  6/10/2019-[place]    [address]



  PPO



 [facility]    t    [facility]        [phone]  









 Guarantor Name  Account Type  Relation to  Date of  Phone  Billing



     Patient  Birth    Address

 

 Closs,Mary Landry  Personal/Family  Self  1955  [phone]  [address]







         (Home)  [place] [address]



documented as of this encounter

## 2020-01-22 NOTE — XMS REPORT
Patient Summary Document

 Created on:2020



Patient:CLOSS, MARY SUZANNE

Sex:Female

:1955

External Reference #:[id number]





Demographics







 Address  [address] [place] [address]

 

 Home Phone  [phone]

 

 Work Phone  [phone]

 

 Email Address  NONE

 

 Preferred Language  Unknown

 

 Marital Status  Unknown

 

 Nondenominational Affiliation  Unknown

 

 Race  Unknown

 

 Additional Race(s)  Unavailable

 

 Ethnic Group  Unknown









Author







 Organization  [facility]

 

 Address  [address]



   [place] [address]

 

 Phone  [phone]









Care Team Providers







 Name  Role  Phone

 

 DALE DAVENPORT  Unavailable  Unavailable

 

 TUNG, LORA SHAUCHING  Unavailable  Unavailable

 

 DOMONIQUE, VON  Unavailable  Unavailable

 

 MATT JENKINS  Unavailable  Unavailable









Problems

This patient has no known problems.



Allergies, Adverse Reactions, Alerts

This patient has no known allergies or adverse reactions.



Medications

This patient has no known medications.



Results







 Test Description  Test Time  Test Comments  Text Results  Atomic Results  
Result Comments









 MR, BRAIN,  2019-10-07  Cyberknife simulation with  FINAL REPORT PATIENT ID:  



 WITH  11:31:00  mask; restaging after  [id number] MR, BRAIN, WITH  



     Cyberknife for brain  \T\ WITHOUT CONTRAST  



     metastasis Reason for  INDICATION: Neoplasm:  



     Exam:-&gt;Cyberknife  head, metastaticCyberknife  



     simulation with mask;  simulation with mask;  



     restaging after Cyberknife  restaging after Cyberknife  



     for brain metastasis  for brain  



       metastasisMetastatic  



       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  



       TECHNIQUE: Multiplanar,  



       multisequence MR imaging  



       of the brain was obtained  



       before and after  



       uneventful administration  



       of gadolinium contrast.  



       COMPARISON: 2019  



       FINDINGS:Left frontal  



       resection cavity  



       demonstrates grossly  



       unchanged size when  



       compared to the prior  



       examination. There is  



       faint peripheral  



       enhancement without  



       nodularity. No mass lesion  



       has developed within the  



       resection cavity or within  



       the remaining portions of  



       the brain parenchyma.  



       Adjacent white matter  



       hyperintensity extending  



       to the ependymal surface  



       is unchanged. Positioning  



       of the calvarial flap  



       remains anatomic.  



       Remaining portions of the  



       brain parenchyma  



       demonstrate no evidence of  



       acute or subacute infarct.  



       There has been no recent  



       intracranial hemorrhage.  



       The midline is normally  



       positioned. Ventricular  



       configuration is within  



       normal limits. Brain  



       structures along the  



       midline and at the brain  



       base are unremarkable. No  



       abnormality of the skull  



       base or calvarium is  



       present. The included  



       paranasal sinuses, mastoid  



       air cells, and orbits are  



       within normal limits.  



       IMPRESSION:  Although  



       there is slight peripheral  



       enhancement along the  



       resection cavity in the  



       current examination, there  



       is no evidence of new  



       nodular lesion in this  



       location or within the  



       remaining intracranial  



       compartment. This may  



       reflect treatment effect.  



       Attention on follow-up  



       advised. Signed:  



       JR Mcneil Robert MDReport Verified  



       Date/Time:  10/07/2019  



       11:31:07 Reading Location:  



       [facility]  



       Reading Room  



       Electronically signed by:  



       VASHTI MCNEIL on  



       10/07/2019 11:31 AM  

 

 MR, BRAIN,  2019  Hx brain metastasis.  FINAL REPORT PATIENT ID:  



 WITH  15:43:00  Please scan in treatment  [id number] MR, BRAIN, WITH  



     mask and include cyberknife  \T\ WITHOUT CONTRAST  



     protocol.  INDICATION: Neoplasm:  



       head, CNS, rx monitor or  



       f/u TECHNIQUE:  



       Multiplanar, multisequence  



       MR imaging of the brain  



       was obtained. COMPARISON:  



       2019  



       FINDINGS: Prior left  



       frontal craniotomy.  



       Subjacent to the  



       craniotomy there is no  



       significant interval  



       change in appearance of  



       the left frontal resection  



       cavity which demonstrates  



       thin peripheral rim  



       enhancement. No nodular  



       enhancement. Two punctate  



       foci of restricted  



       diffusion along the  



       posterior resection margin  



       are present, the medial of  



       which was present on prior  



       exam 2019,  



       with attention on  



       follow-up recommended. No  



       interval change in extent  



       of T2/FLAIR hyperintensity  



       extending from the  



       resection margin to the  



       frontal horn of the left  



       lateral ventricle without  



       significant mass effect.  



       No new abnormal  



       enhancement throughout the  



       brain parenchyma.  



       Remainder the brain  



       parenchyma is  



       unremarkable. Midline  



       structures are normally  



       developed. Cerebellar  



       tonsils remain low-lying  



       but do not meet criteria  



       for Chiari malformation.  



       No hydrocephalus. Orbits  



       are within normal limits.  



       No obstructive paranasal  



       sinus disease. IMPRESSION:  



        Status post resection of  



       left frontal lesion  



       without nodular  



       enhancement within the  



       left frontal lobe or  



       elsewhere within the brain  



       parenchyma.. Linear  



       enhancement remains  



       surrounding the resection  



       cavity. Two foci of  



       restricted diffusion of  



       the posterior margin of  



       the resection cavity are  



       indeterminate with  



       cellular tumor not  



       excluded. Recommend  



       attention on follow-up.  



       FLAIR hyperintensity  



       extending from the  



       resection margin medially  



       to the frontal horn of the  



       left lateral ventricle is  



       unchanged. Signed:  



       Michelle Luque  



       Verified Date/Time:  



       2019 15:43:51  



       Reading Location: [facility] Radiology Reading  



       Room  Electronically  



       signed by: MICHELLE LUQUE MD on 2019  



       03:43 PM  









 POCT-CREATININE  2019 11:04:00    









   Test Item  Value  Reference Range  Comments









 POC-CREATININE (DILCIA) (test  0.8 mg/dL  0.6-1.3  TESTED AT [facility]



 code=1859)      [facility]



       [id number]

 

 POC-EGFR (DILCIA) (test  72 mL/min/1.73M2    



 code=1860)      



TISSUE [id number] 12:10:00Surgical Pathology Report                         
Case: [id number]                                 Authorizing Provider:  Lora Wagner, Collected:           2019 1016                 MD   
                                                                      
OrderingLocation:     [facility]         Received:            2019 1243           SERVICES                                                   
                Pathologist:        Camille Espinosa MD                  
                                Specimens:   A) -Fallopian Tube, Left &amp; 
Ovary, left tube and ovary                                            B) - 
Uterus w/Right Fallopian Tube &amp; Ovary                                      
                  C)- Cervix, additional anterior cervix                       
                                    D) - Lymph Node, right pelvic lymph node   
                                                       E) - Lymph Node, left 
pelvic lymph node                                              A. TUBE AND OVARY
, LEFT,SALPINGO-OOPHORECTOMY:        OVARY            - METASTATIC HIGH GRADE 
CARCINOMA         TUBE    - NO TUMOR PRESENT B. UTERUS, CERVIX, RIGHT TUBE AND 
OVARY, HYSTERECTOMY AND RIGHT SALPINGO-OOPHORECTOMY:        UTERUS             
  - MIXED HIGH GRADE ENDOMETRIAL CARCINOMA, COMPOSED OF SEROUS CARCINOMA AND 
ENDOMETRIOID CARCINOMA, FIGO GRADE 3               - TUMOR INVADES THROUGH THE 
MYOMETRIUMAND PENETRATES THE SEROSA               - LYMPHOVASCULAR SPACE 
INVASION PRESENT                - TUMOR INVOLVES THE LOWER UTERINE SEGMENT     
    CERVIX               - TUMOR INVOLVES ENDOCERVICAL GLANDS        RIGHT TUBE
              - NO TUMOR PRESENT         RIGHT OVARY             - NO TUMOR 
PRESENT              - ENDOSALPINGIOSIS C. ADDITIONAL ANTERIOR CERVIX, EXCISION
:           - FOCALLY ULCERATED SQUAMOUS MUCOSA, NO TUMOR PRESENT D. LYMPH NODES
, RIGHT PELVIC, DISSECTION:           - FOUR LYMPH NODES, NO TUMOR PRESENT (0/4)
E. LYMPH NODES, LEFT PELVIC, DISSECTION:           - FOUR LYMPH NODES, NO TUMOR 
PRESENT (0/4)      Signing Pathologist Direct Phone Line: [phone]
[phone] signed by Camille Espinosa MD on 2019 at 12:10 
PMThe tumor shows an admixture of serous and endometrioid carcinoma. The 
ovarian metastasis is predominantly serous carcinoma. Immunohistochemical 
stains performed on tumor in uterus and ovary show the following results: 
Uterus WT-1- negativeER- diffusely positive p16 - patchy qtnqwvuvp05 - variable 
staining, with some foci of wild type and other foci of diffuse stainingOvaryWT-
1- negative ER- diffusely positive p16 - diffuse pysrcgyhn79 - diffuse 
[id number] X 188307 X 388305 X 188342 x 288341 x 6Malignant neoplasm of 
uterus A. Left tube and ovary. B. Uterus with right fallopian tube and ovary. 
C. Additional anterior cervix. D. Right pelvic lymph node. E. Left pelvic lymph 
node Part A. Labeled "left tube and ovary" consists of a 364 gm left salpingo-
oophorectomy with fallopian tube measuring 4 cm in length x 0.5 cm in diameter. 
The enlarged cystic ovary measures 14.5 x 12 x 3.5 cm and is received 
collapsed. The surface is primarily intact. It is inked and serially sectioned 
showing the entire ovary to be replaced by a tan, off-white, lobulated, firm 
mass with cystic changes. There is one unilocular cyst that was disrupted and 
measuring approximately 8 cm in greatest dimension. The locule has a smooth 
inner and outer lining.Representativesections are submitted as follows: A1, A2, 
entire fallopian tube; A3-A15, representative of mass; A16-A18, representative 
of cyst.Part B. Labeled "u; with right fallopian tube and ovary" consists of a 
151 gm hysterectomy with right fallopian tube and ovary. The right fallopian 
tube measures 5.5 cm in length x 0.4 cm in diameter. The right ovary measures 2 
x 1 x 0.5 cm. The fallopian tube and ovary are grossly unremarkable. The 
uterine corpus is irregular shaped and ragged and slightly nodular. The 
ectocervix measures 1.5 cm in greatest dimension. The external os is round 
measuring 0.5 cm.Ink code: Anterior uterus-blue and posterior-black.Grossly the 
entire endometrial cavity is replaced by a tan, off-white, multinodular, 
glistening mass measuring approximately 6 x 5.5 cm with a maximum depth of 2.5 
cm. The mass abuts the anterior and posterior serosa and is 3.5 cm from the 
anterior ectocervix and 4.5 cm from the posterior ectocervix.Section code: B1, 
B2, entire fallopian tube; B3, entire ovary;B4-B9, one contiguous section of 
anterior uterine wall from dome to ectocervix; B10-B15, additional one 
contiguous section of anterior wall from dome to ectocervix; B16-B20, posterior 
uterine wall fromdome to cervix; B21-B25, posterior uterine wall from dome to 
cervix.Part C. Labeled "additional anterior cervix" consists of an irregular 
fragment of tan hemorrhagic soft tissue measuring 1.5 x 0.8 x 0.5 cm. The 
specimen is serially sectioned and submitted entirely in C1.Part D. Labeled 
"right pelvic lymph node" consists of adipose tissue measuring 2 x 1.5 x 1 cm 
yielding two red lymph nodes measuring 0.8 x 1 cm. The specimen is entirely 
submitted as follows: D1, two lymph nodes; D2, remainder of fat.Part E. Labeled 
"left pelvic lymph node" consists of adipose tissue measuring 2 x 1.5 x 1 cm in 
aggregate consisting of three tan lymph nodes ranging from 0.8 to 1 cm. The 
specimen is entirely submitted as follows: E1, three lymph nodes; E2, remainder 
of fat. CG/ewPerformed.CAP CANCER SYNOPTIC REPORT: UterusSpecimen:  Uterine 
corpus, cervix, bilateral tubes and ovaries Procedure:  Hysterectomy and 
bilateral salpingo-oophorectomyLymph Node Sampling:  Performed, bilateral 
pelvicTumor Site:  Anterior and posterior endomyometriumHistological Type:  
Mixed carcinoma, combined serous and endometrioid FIGO Grade 3Depth of 
myometrial invasion: Tumo invades entire thickness and penetrates serosa 
Involvement of Cervix:  InvolvedExtensive Involvement of Other Organs:  Left 
ovarian metastasisLymph Vascular Invasion:  PresentPATHOLOGIC STAGING (TNM/FIGO)
: Stage IIIA Primary Tumor:  pT3a: Tumor invades through serosa and involves 
ovary Regional Lymph Nodes:  pN0:  No regional  lymph node metastasis  Number 
of Lymph Nodes Examined:  8  Number of Lymph Nodes Involved:  0 Distant 
Metastasis (pM):  Not applicableThe interpretation of this case included the 
use of immunohistochemistry or special stains. Immunohistochemistry technical 
testing was performed at [facility], Pathology Laboratory 
where it was developed and its performance characteristics were determined. It 
has not been cleared or approved by the U.S. Food and Drug Administration. The 
FDA has determined that such clearance orapproval is not necessary. The test is 
used for clinical purposes. It should not be regarded as investigational or for 
research. This laboratory is certified under the Clinical Laboratory 
Improvement Amendments of 1988 (CLIA-88) as qualified to perform high 
complexity clinical laboratory testing.MR, BRAIN, [id number] 12:36:00Please 
scan in mask and include cyberknife protocol.FINAL REPORT PATIENT ID:   
[id number] MR, BRAIN, WITH \T\ WITHOUT CONTRAST INDICATION: Neoplasm: head,CNS, 
rx monitor or f/umonitor for new brain metastasis, s/p radiation TECHNIQUE: 
Multiplanar, multisequence MR imaging of the brain was obtained before and 
after uneventful administration of gadoliniumcontrast. Thin section 
postcontrast imaging per CyberKnife protocol. COMPARISON: 2018 
FINDINGS:There is continued contraction of the resection cavity in the left 
frontal lobe with commensurate decreased prominence of adjacent underlying T2 
white matter infiltrative change. The periphery ofthe resection cavity again 
demonstrates postcontrast enhancement, but to a lesser degree than in 
2018 examination. No new lesions have developed. The brain 
parenchyma is otherwise unremarkable with no evidence of recent infarct or 
hemorrhage. There is no midline shift or ventriculomegaly. There is a stable, 
unremarkable positioning of the calvarial flap. Osseous structures demonstrate 
no acute abnormalities. There is no discernible paranasal sinus disease is. 
Orbital contents are unremarkable.  IMPRESSION:  Continuing contraction of 
resection cavity with decreasing peripheral enhancement. No new enhancement or 
nodularity to suggest recurrent or residual disease.  CyberKnife planning 
protocol. Signed: JR Mcneil Robert MDReport Verified Date/Time:  2019 12:36:31 Reading Location: [facility] Radiology Reading Room    
Electronically signed by: VASHTI Rocha 2019 12:36 PMCBC (
HEMOGRAM ONLY)2019 08:59:00





 Test Item  Value  Reference Range  Comments

 

 WHITE BLOOD CELL COUNT (BEAKER) (test  4.4 K/ L  3.5-10.5  



 code=775)      

 

 RED BLOOD CELL COUNT (BEAKER) (test  4.06 M/ L  3.93-5.22  



 code=761)      

 

 HEMOGLOBIN (BEAKER) (test code=410)  9.6 GM/DL  11.2-15.7  

 

 HEMATOCRIT (BEAKER) (test code=411)  31.5 %  34.1-44.9  Patient transfused

 

 MEAN CORPUSCULAR VOLUME (BEAKER) (test  77.6 fL  79.4-94.8  



 bzqw=199)      

 

 MEAN CORPUSCULAR HEMOGLOBIN (BEAKER)  23.6 pg  25.6-32.2  



 (test code=751)      

 

 MEAN CORPUSCULAR HEMOGLOBIN CONC  30.5 GM/DL  32.2-35.5  



 (BEAKER) (test code=752)      

 

 RED CELL DISTRIBUTION WIDTH (BEAKER)  21.1 %  11.7-14.4  



 (test code=412)      

 

 PLATELET COUNT (BEAKER) (test  153 K/CU MM  150-450  



 huvs=417)      

 

 MEAN PLATELET VOLUME (BEAKER) (test  9.6 fL  9.4-12.3  



 code=754)      

 

 NUCLEATED RED BLOOD CELLS (BEAKER)  0 /100 WBC  0-0  



 (test code=413)      



[id number] 15:42:00Medical Cytology Report                           
Case: [id number]                                 Authorizing Provider:  Lora Wagner, Collected:           2019 0934                 MD   
                                                                      
OrderingLocation:     [facility] PERIOPERATIVE         Received:            2019 1030           SERVICES                                                   
                Pathologist:        Tyrell Jaime MD                            
                                Specimen:    Peritoneal Fluid, Ascites         
                                                   PERITONEAL FLUID (CYTOSPINS 
AND CELL BLOCK):   - NEGATIVE FOR MALIGNANCY (SEE COMMENT)      Signing 
Pathologist Direct Phone Line: [phone] signed by Tyrell Jaime MD on 2019 at  3:42 PMCytospins and cell block sections show clusters of 
mesothelial cells and macrophages with reactive changes in a background of 
lymphocytes and scattered neutrophils. Immunostains performed on cell block 
sections show mesothelial cells are highlighted by calretinin and CAM5,2, while 
macrophages are highlighted by CD68. MOC31 is predominantly negative, while 
PAX8 highlights a few mesothelial cells. No diagnostic features of metastatic 
carcinoma is seen. 79064, 37460, 12591, 25455 x 4Ascites, history of 
endometrial carcinoma, status post laparoscopyPERITONEAL FLUID20 mls blood-
tinged; 4 cytospinsCollected: [id number]: 120790AjazrwfgzuxqXgb 
interpretation of this case included the use of immunohistochemistry or special 
stains. Please see the immunohistochemistry results in the COMMENT section. The 
immunohistochemistry test was developed and its performance characteristics 
determined by [facility], Pathology Laboratory. It has not 
been cleared or approved by the U.S. Food and Drug Administration. The FDA has 
determined that such clearance or approval is not necessary. The test is used 
for clinical purposes. It should not be regarded as investigational or for 
research. This laboratory is certified under the Clinical Laboratory 
Improvement Amendments of 1988 (CLIA-88) as qualified to perform high 
complexity clinical laboratory testing.[facility], 
Department of Pathology, [address] [place] [address], Tel [phone]
[phone], [facility], [address] [place] [address], Tel [phone] [facility], 
Department of Pathology, [address] [place] [address], Tel [phone]
[phone] METABOLIC [id number] 05:52:00





 Test Item  Value  Reference Range  Comments

 

 SODIUM (BEAKER) (test  142 meq/L  136-145  



 jehf=748)      

 

 POTASSIUM (BEAKER) (test  3.5 meq/L  3.5-5.1  



 code=379)      

 

 CHLORIDE (BEAKER) (test  112 meq/L    



 code=382)      

 

 CO2 (BEAKER) (test  23 meq/L  22-29  



 code=355)      

 

 BLOOD UREA NITROGEN  10 mg/dL  7-21  



 (BEAKER) (test code=354)      

 

 CREATININE (BEAKER) (test  0.67 mg/dL  0.57-1.25  



 code=358)      

 

 GLUCOSE RANDOM (BEAKER)  80 mg/dL    



 (test code=652)      

 

 CALCIUM (BEAKER) (test  8.1 mg/dL  8.4-10.2  



 code=697)      

 

 EGFR (BEAKER) (test  89 mL/min/1.73 sq m    ESTIMATED GFR IS NOT AS



 code=1092)      ACCURATE AS CREATININE



       CLEARANCE IN PREDICTING



       GLOMERULAR FILTRATION



       RATE. ESTIMATED GFR IS



       NOT APPLICABLE FOR



       DIALYSIS PATIENTS.



CBC W/PLT COUNT &amp; AUTO [id number] 05:36:00





 Test Item  Value  Reference Range  Comments

 

 WHITE BLOOD CELL COUNT (BEAKER) (test code=775)  4.5 K/ L  3.5-10.5  

 

 RED BLOOD CELL COUNT (BEAKER) (test code=761)  3.25 M/ L  3.93-5.22  

 

 HEMOGLOBIN (BEAKER) (test code=410)  7.5 GM/DL  11.2-15.7  

 

 HEMATOCRIT (BEAKER) (test code=411)  24.9 %  34.1-44.9  

 

 MEAN CORPUSCULAR VOLUME (BEAKER) (test code=753)  76.6 fL  79.4-94.8  

 

 MEAN CORPUSCULAR HEMOGLOBIN (BEAKER) (test  23.1 pg  25.6-32.2  



 qati=609)      

 

 MEAN CORPUSCULAR HEMOGLOBIN CONC (BEAKER) (test  30.1 GM/DL  32.2-35.5  



 code=752)      

 

 RED CELL DISTRIBUTION WIDTH (BEAKER) (test  21.3 %  11.7-14.4  



 code=412)      

 

 PLATELET COUNT (BEAKER) (test code=756)  126 K/CU MM  150-450  

 

 MEAN PLATELET VOLUME (BEAKER) (test code=754)  9.8 fL  9.4-12.3  

 

 NUCLEATED RED BLOOD CELLS (BEAKER) (test  0 /100 WBC  0-0  



 code=413)      

 

 NEUTROPHILS RELATIVE PERCENT (BEAKER) (test  59 %    



 code=429)      

 

 LYMPHOCYTES RELATIVE PERCENT (BEAKER) (test  23 %    



 code=430)      

 

 MONOCYTES RELATIVE PERCENT (BEAKER) (test  13 %    



 code=431)      

 

 EOSINOPHILS RELATIVE PERCENT (BEAKER) (test  5 %    



 code=432)      

 

 BASOPHILS RELATIVE PERCENT (BEAKER) (test  1 %    



 code=437)      

 

 NEUTROPHILS ABSOLUTE COUNT (BEAKER) (test  2.60 K/ L  1.56-6.13  



 code=670)      

 

 LYMPHOCYTES ABSOLUTE COUNT (BEAKER) (test  1.03 K/ L  1.18-3.74  



 code=414)      

 

 MONOCYTES ABSOLUTE COUNT (BEAKER) (test  0.57 K/ L  0.24-0.36  



 code=415)      

 

 EOSINOPHILS ABSOLUTE COUNT (BEAKER) (test  0.21 K/ L  0.04-0.36  



 code=416)      

 

 BASOPHILS ABSOLUTE COUNT (BEAKER) (test  0.03 K/ L  0.01-0.08  



 code=417)      

 

 IMMATURE GRANULOCYTES-RELATIVE PERCENT (BEAKER)  0 %  0-1  



 (test code=2801)      



BUN AND [id number] 09:15:00





 Test Item  Value  Reference Range  Comments

 

 BLOOD UREA NITROGEN  13 mg/dL  7-21  



 (BEAKER) (test code=354)      

 

 CREATININE (BEAKER) (test  0.71 mg/dL  0.57-1.25  



 code=358)      

 

 EGFR (BEAKER) (test  83 mL/min/1.73 sq m    ESTIMATED GFR IS NOT AS



 code=1092)      ACCURATE AS CREATININE



       CLEARANCE IN PREDICTING



       GLOMERULAR FILTRATION



       RATE. ESTIMATED GFR IS



       NOT APPLICABLE FOR



       DIALYSIS PATIENTS.



[id number] 06:20:00





 Test Item  Value  Reference Range  Comments

 

 SODIUM (BEAKER) (test code=381)  138 meq/L  136-145  

 

 POTASSIUM (BEAKER) (test code=379)  3.7 meq/L  3.5-5.1  

 

 CHLORIDE (BEAKER) (test code=382)  110 meq/L    

 

 CO2 (BEAKER) (test code=355)  21 meq/L  22-29  



CBC W/PLT COUNT &amp; AUTO [id number] 05:59:00





 Test Item  Value  Reference Range  Comments

 

 WHITE BLOOD CELL COUNT (BEAKER) (test code=775)  5.8 K/ L  3.5-10.5  

 

 RED BLOOD CELL COUNT (BEAKER) (test code=761)  3.66 M/ L  3.93-5.22  

 

 HEMOGLOBIN (BEAKER) (test code=410)  8.5 GM/DL  11.2-15.7  

 

 HEMATOCRIT (BEAKER) (test code=411)  27.8 %  34.1-44.9  

 

 MEAN CORPUSCULAR VOLUME (BEAKER) (test code=753)  76.0 fL  79.4-94.8  

 

 MEAN CORPUSCULAR HEMOGLOBIN (BEAKER) (test  23.2 pg  25.6-32.2  



 hnuk=111)      

 

 MEAN CORPUSCULAR HEMOGLOBIN CONC (BEAKER) (test  30.6 GM/DL  32.2-35.5  



 code=752)      

 

 RED CELL DISTRIBUTION WIDTH (BEAKER) (test  20.8 %  11.7-14.4  



 code=412)      

 

 PLATELET COUNT (BEAKER) (test code=756)  118 K/CU MM  150-450  

 

 MEAN PLATELET VOLUME (BEAKER) (test code=754)  9.9 fL  9.4-12.3  

 

 NUCLEATED RED BLOOD CELLS (BEAKER) (test  0 /100 WBC  0-0  



 code=413)      

 

 NEUTROPHILS RELATIVE PERCENT (BEAKER) (test  69 %    



 code=429)      

 

 LYMPHOCYTES RELATIVE PERCENT (BEAKER) (test  16 %    



 code=430)      

 

 MONOCYTES RELATIVE PERCENT (BEAKER) (test  13 %    



 code=431)      

 

 EOSINOPHILS RELATIVE PERCENT (BEAKER) (test  1 %    



 code=432)      

 

 BASOPHILS RELATIVE PERCENT (BEAKER) (test  1 %    



 code=437)      

 

 NEUTROPHILS ABSOLUTE COUNT (BEAKER) (test  4.03 K/ L  1.56-6.13  



 code=670)      

 

 LYMPHOCYTES ABSOLUTE COUNT (BEAKER) (test  0.95 K/ L  1.18-3.74  



 code=414)      

 

 MONOCYTES ABSOLUTE COUNT (BEAKER) (test  0.75 K/ L  0.24-0.36  



 code=415)      

 

 EOSINOPHILS ABSOLUTE COUNT (BEAKER) (test  0.03 K/ L  0.04-0.36  



 code=416)      

 

 BASOPHILS ABSOLUTE COUNT (BEAKER) (test  0.03 K/ L  0.01-0.08  



 code=417)      

 

 IMMATURE GRANULOCYTES-RELATIVE PERCENT (BEAKER)  0 %  0-1  



 (test code=2801)      



HEMOGLOBIN AND [id number] 11:31:00





 Test Item  Value  Reference Range  Comments

 

 HEMOGLOBIN (BEAKER) (test code=410)  9.0 g/dL  12.0-15.0  

 

 HEMATOCRIT (BEAKER) (test code=411)  26.0 %  36.0-45.0  



BLOOD GAS, [id number] 10:46:00





 Test Item  Value  Reference Range  Comments

 

 PH VENOUS (BEAKER) (test code=701)  7.42  7.32-7.42  

 

 PCO2 VENOUS (BEAKER) (test code=755)  33 mm Hg  41-51  

 

 PO2 VENOUS (BEAKER) (test code=702)  52 mm Hg  25-40  

 

 O2 SATURATION VENOUS (BEAKER) (test code=703)  87.6 %  40.0-70.0  

 

 HCO3 VENOUS (BEAKER) (test code=705)  20 mmol/L  21-29  

 

 BASE EXCESS VENOUS (BEAKER) (test code=704)  -3.7 mmol/L  -2.0-3.0  



CALCIUM, [id number] 10:46:00





 Test Item  Value  Reference Range  Comments

 

 CALCIUM IONIZED (BEAKER) (test code=698)  1.03 mmol/L  1.12-1.27  

 

 PH, BLOOD (BEAKER) (test code=1810)  7.42    



CBC W/PLT COUNT &amp; AUTO [id number] 07:19:00





 Test Item  Value  Reference Range  Comments

 

 WHITE BLOOD CELL COUNT (BEAKER) (test code=775)  3.1 K/ L  3.5-10.5  

 

 RED BLOOD CELL COUNT (BEAKER) (test code=761)  3.86 M/ L  3.93-5.22  

 

 HEMOGLOBIN (BEAKER) (test code=410)  8.3 GM/DL  11.2-15.7  

 

 HEMATOCRIT (BEAKER) (test code=411)  28.8 %  34.1-44.9  

 

 MEAN CORPUSCULAR VOLUME (BEAKER) (test code=753)  74.6 fL  79.4-94.8  

 

 MEAN CORPUSCULAR HEMOGLOBIN (BEAKER) (test  21.5 pg  25.6-32.2  



 gpjy=135)      

 

 MEAN CORPUSCULAR HEMOGLOBIN CONC (BEAKER) (test  28.8 GM/DL  32.2-35.5  



 code=752)      

 

 RED CELL DISTRIBUTION WIDTH (BEAKER) (test  22.5 %  11.7-14.4  



 code=412)      

 

 PLATELET COUNT (BEAKER) (test code=756)  149 K/CU MM  150-450  

 

 MEAN PLATELET VOLUME (BEAKER) (test code=754)  10.4 fL  9.4-12.3  

 

 NUCLEATED RED BLOOD CELLS (BEAKER) (test  0 /100 WBC  0-0  



 code=413)      

 

 NEUTROPHILS RELATIVE PERCENT (BEAKER) (test  57 %    



 code=429)      

 

 LYMPHOCYTES RELATIVE PERCENT (BEAKER) (test  22 %    



 code=430)      

 

 MONOCYTES RELATIVE PERCENT (BEAKER) (test  14 %    



 code=431)      

 

 EOSINOPHILS RELATIVE PERCENT (BEAKER) (test  7 %    



 code=432)      

 

 BASOPHILS RELATIVE PERCENT (BEAKER) (test  1 %    



 code=437)      

 

 NEUTROPHILS ABSOLUTE COUNT (BEAKER) (test  1.77 K/ L  1.56-6.13  



 code=670)      

 

 LYMPHOCYTES ABSOLUTE COUNT (BEAKER) (test  0.69 K/ L  1.18-3.74  



 code=414)      

 

 MONOCYTES ABSOLUTE COUNT (BEAKER) (test  0.43 K/ L  0.24-0.36  



 code=415)      

 

 EOSINOPHILS ABSOLUTE COUNT (BEAKER) (test  0.22 K/ L  0.04-0.36  



 code=416)      

 

 BASOPHILS ABSOLUTE COUNT (BEAKER) (test  0.02 K/ L  0.01-0.08  



 code=417)      

 

 IMMATURE GRANULOCYTES-RELATIVE PERCENT (BEAKER)  0 %  0-1  



 (test code=2801)      



[id number] 11:42:00





 Test Item  Value  Reference Range  Comments

 

 SODIUM (BEAKER) (test code=381)  142 meq/L  136-145  

 

 POTASSIUM (BEAKER) (test code=379)  4.2 meq/L  3.5-5.1  

 

 CHLORIDE (BEAKER) (test code=382)  108 meq/L    

 

 CO2 (BEAKER) (test code=355)  27 meq/L  22-29  



BUN AND [id number] 11:42:00





 Test Item  Value  Reference Range  Comments

 

 BLOOD UREA NITROGEN  16 mg/dL  7-21  



 (BEAKER) (test code=354)      

 

 CREATININE (BEAKER) (test  0.81 mg/dL  0.57-1.25  



 code=358)      

 

 EGFR (BEAKER) (test  71 mL/min/1.73 sq m    ESTIMATED GFR IS NOT AS



 code=1092)      ACCURATE AS CREATININE



       CLEARANCE IN PREDICTING



       GLOMERULAR FILTRATION



       RATE. ESTIMATED GFR IS



       NOT APPLICABLE FOR



       DIALYSIS PATIENTS.



[id number] 11:09:00





 Test Item  Value  Reference Range  Comments

 

 HEMOGLOBIN (BEAKER) (test code=410)  8.2 GM/DL  11.2-15.7  



PLATELET [id number] 11:09:00





 Test Item  Value  Reference Range  Comments

 

 PLATELET COUNT (BEAKER) (test code=756)  109 K/CU MM  150-450  



CT, CHEST, WITH [id number] 11:36:00FINAL REPORT PATIENT ID:   [id number] 
CT of the chest, with contrast Clinical History:  LUNG NODULES, UTERINE CANCER 
Technique: CT of the chest is performed with intravenous contrast 
administration. Thisexam was performed according to our departmental dose 
optimization program which includes automated exposure control, adjustment of 
the mA and/or kV according to patient's size and/or use of iterative 
reconstructive technique. Comparison Film:  2018 Discussion: Exam 
is mildly degraded by patient's respiratory motion. There is a right-sided Port-
A-Cath. Visualized thyroid gland is normal. There is no subclavicular, axillary
, mediastinal or hilar lymphadenopathy. Heart and pericardium are unremarkable. 
Previously seen pulmonary nodules have resolved. No new mass or consolidation. 
There isno pleural effusion. Central airways are patent, no bronchiectasis, or 
bronchial wall thickening. Please refer to recently performed abdomen CT for 
discussion of intra-abdominal findings. Osseous structures demonstrate 
degenerative changes. No suspicious bony lesion is identified. Impression: 
Resolution of previously seen pulmonary nodules, compatible with treatment 
response. Signed: Clive Mesa MDReport Verified Date/Time:  2019 11:36:19 
Reading Location: [facility] B1 C013Y CT Body Reading RoomElectronically signed by: 
CLIVE MESA M.D. on 2019 11:36 AMCT, [id number] 11:04:00FINAL 
REPORT PATIENT ID:   [id number]  CT of the abdomen and pelvis, with contrast 
Clinical History:  MALIGNANT NEOPLASM UTERUS Technique: CT of the abdomen and 
pelvis is performed with intravenous contrast administration.  This exam was 
performed according to our departmental dose optimization program which 
includes automated exposure control, adjustment of the mA and/or kV according 
to patient's sizeand/or use of iterative reconstructive technique. Comparison 
Film:  None Discussion: There is apparent intraluminal nodule or lesion versus 
folded mucosa in the distal esophagus. At the lung bases, previously seen 1.4 
cm right lower lobe nodule is no longer identified. No liver lesion is seen. 
Heterogeneous density within the gallbladder may reflect sludge is no biliary 
ductal dilatation. Spleen, pancreas, and adrenal glands are unremarkable. 
Kidneys demonstrate no hydronephrosis, or radiopaque stone. At the left upper 
pole, there is a 2.2 cm cyst. Status post gastric sleeve surgery. No evidence 
ofbowel obstruction and no abnormal bowel wall thickening is identified. In the 
pelvis, there is a large mixed solid and cystic mass that measures 
approximately 16.4 x 13.1 x 12.3 cm; it is inseparable from the uterine fundus, 
which also has a heterogeneous appearance, as well as both adnexa. It has grown 
since the prior exam, although the solid component appears smaller. There is a 
right pelvic lymph node, near the bifurcation of common iliac vessels, 
measuring 1 cm, and previously it measures 1.5 cm(prior exam axial image 95), 
compatible with treatment response. No new adenopathy. Bladder is unremarkable. 
No ascites or effusion retroperitoneal lymph nodes are unchanged. Osseous 
structures demonstrate degenerative changes. No suspicious bony lesion is 
identified. Impression: Large mixed solid andcystic pelvic mass inseparable 
from uterine fundus, and bilateral adnexa; while it has grown in overall size, 
the solid component appears smaller. A right iliac chain lymph node has also 
decreased in size, suggestive of treatment response. Resolution of a previously 
seen right lower lung metastatic deposit. Intraluminal nodule versus folded 
mucosa in the distal esophagus, suggest close attention on follow-up exam. 
Status post gastric sleeve surgery. Signed: Clive Mesa MDReport Verified Date/Time
:  2019 11:04:56 Reading Location: [address] Radiology Reading Room  
      Electronically signed by: CLIVE MESA M.D. on 2019 11:04 AMPOCT-
[id number] 09:37:00





 Test Item  Value  Reference Range  Comments

 

 POC-CREATININE (BEAKER)  0.7 mg/dL  0.6-1.3  TESTED AT [facility] [address]



 (test code=1859)      [facility]



       [id number]

 

 POC-EGFR (BEAKER) (test  84 mL/min/1.73M2    



 xkhl=3118)      



ANG, TUNNEL CATH CENTRAL INS W/PORT -74-07 14:21:00Reason for Exam:-&gt;
c54.1FINAL REPORT PATIENT ID:   [id number]  Chest praveen cath insertion: Pertinent 
clinical information: C 54.1 Modality: Sonography and fluoroscopy Conscious 
Sedation: Versed 1 mg and fentanyl 50 mcg intravenouslyDuring the procedure 
with conscious sedation, the patient was monitored continuously with pulse 
oximetry and electrocardiography by the attending physician and registered 
nurse.  Patient Physician face to face intraservice time: 40 minutes Antibiotics
: Vancomycin 1 g intravenously Fluoro time in minutes and number of images: 1.1 
minutes. Three images. Anesthesia:  Two percent Lidocaine injected 
subcutaneously at the insertion site and tunnel. Approach: Right internal 
jugular veinFor maximum sterile barrier protection a mask, cap, sterile gloves, 
sterile drape, sterile gown, and a cutaneous antiseptic was utilized. Technique
:  After informed written consent was obtained, the patient was preppedand 
draped in the usual sterile manner.  Access was obtained using sonographic 
guidance. Hard copy images of the vein were submitted for interpretation.  
Ultrasound was used to document patency, compressibility and decrease 
unnecessary punctures.  The right internal jugular vein was catheterized.  A 
guide wire  was advanced centrally.  A 20 cm 7 Thai Passport catheter was 
advanced with its distal tip terminating at the cavoatrial junction.  The 
catheter was flushed and aspirated easily following placement.  A subcutaneous 
pocket was created in the right anterior chest wall by blunt dissection.  The 
pocket was lavaged with an antimicrobial solution.The pocket was then closed 
using a running subcuticular 3.0 Vicryl suture.  The port was then accessed 
following closure and flushed with a saline solution.  Vital signs were 
monitored throughout the procedure by a nurse and the radiologist, and remained 
stable.  The patient tolerated the procedure well and left the department in 
the same condition.Results:  Spot radiographs of the chest and upper arm 
demonstrate the new chest port catheter to liein the expected position with its 
tip overlying the cavoatrial junction. Impression:Successful, uncomplicated 
placement of a right chest praveen cath.  This catheter is a power port which is 
rated for power injections if needed.    Signed: Rachell Eisenbergeport Verified 
Date/Time:  2018 14:21:30 Reading Location: [place] [address] Angio Body 
Reading Room      Electronically signed by: RACHELL EISENBERG M.D.on 2018 02:
21 [id number] 07:24:00





 Test Item  Value  Reference Range  Comments

 

 PROTIME (BEAKER) (test code=759)  13.5 seconds  11.7-14.7  

 

 INR (BEAKER) (test code=370)  1.0  <=5.9  

 

 PARTIAL THROMBOPLASTIN TIME (BEAKER) (test  27.0 seconds  22.5-36.0  



 code=760)      



RECOMMENDED COUMADIN/WARFARIN INR THERAPY RANGESSTANDARD DOSE: 2.0 - 3.0   
Includes: PROPHYLAXIS forvenous thrombosis, systemic embolization; TREATMENT 
for venous thrombosis and/or pulmonary embolus.HIGH RISK: Target INR is 2.5-3.5 
for patients with mechanical heart valves.CBC W/PLT COUNT &amp; AUTO 
[id number] 07:22:00





 Test Item  Value  Reference Range  Comments

 

 WHITE BLOOD CELL COUNT (BEAKER) (test code=775)  7.4 K/ L  3.5-10.5  

 

 RED BLOOD CELL COUNT (BEAKER) (test code=761)  4.14 M/ L  3.93-5.22  

 

 HEMOGLOBIN (BEAKER) (test code=410)  8.9 GM/DL  11.2-15.7  

 

 HEMATOCRIT (BEAKER) (test code=411)  30.9 %  34.1-44.9  

 

 MEAN CORPUSCULAR VOLUME (BEAKER) (test code=753)  74.6 fL  79.4-94.8  

 

 MEAN CORPUSCULAR HEMOGLOBIN (BEAKER) (test  21.5 pg  25.6-32.2  



 wnzg=674)      

 

 MEAN CORPUSCULAR HEMOGLOBIN CONC (BEAKER) (test  28.8 GM/DL  32.2-35.5  



 code=752)      

 

 RED CELL DISTRIBUTION WIDTH (BEAKER) (test  19.9 %  11.7-14.4  



 code=412)      

 

 PLATELET COUNT (BEAKER) (test code=756)  363 K/CU MM  150-450  

 

 MEAN PLATELET VOLUME (BEAKER) (test code=754)  9.0 fL  9.4-12.3  

 

 NUCLEATED RED BLOOD CELLS (BEAKER) (test  0 /100 WBC  0-0  



 code=413)      

 

 NEUTROPHILS RELATIVE PERCENT (BEAKER) (test  72 %    



 code=429)      

 

 LYMPHOCYTES RELATIVE PERCENT (BEAKER) (test  15 %    



 code=430)      

 

 MONOCYTES RELATIVE PERCENT (BEAKER) (test  9 %    



 code=431)      

 

 EOSINOPHILS RELATIVE PERCENT (BEAKER) (test  2 %    



 code=432)      

 

 BASOPHILS RELATIVE PERCENT (BEAKER) (test  1 %    



 code=437)      

 

 NEUTROPHILS ABSOLUTE COUNT (BEAKER) (test  5.33 K/ L  1.56-6.13  



 code=670)      

 

 LYMPHOCYTES ABSOLUTE COUNT (BEAKER) (test  1.14 K/ L  1.18-3.74  



 code=414)      

 

 MONOCYTES ABSOLUTE COUNT (BEAKER) (test  0.67 K/ L  0.24-0.36  



 code=415)      

 

 EOSINOPHILS ABSOLUTE COUNT (BEAKER) (test  0.14 K/ L  0.04-0.36  



 code=416)      

 

 BASOPHILS ABSOLUTE COUNT (BEAKER) (test  0.07 K/ L  0.01-0.08  



 code=417)      

 

 IMMATURE GRANULOCYTES-RELATIVE PERCENT (BEAKER)  0 %  0-1  



 (test code=2801)      



MISCELLANEOUS LAB [id number] 09:11:00





 Test Item  Value  Reference Range  Comments

 

 SCAN RESULT (test code=1813000)      



MR, BRAIN, [id number] 15:08:00FINAL REPORT PATIENT ID:   [id number] MRI Brain 
with and without contrast INDICATION: Brain metastasis. TECHNIQUE: Multiplanar, 
multisequence MR imaging of the brain was performed, utilizing the following 
imaging sequences: Axial T1, T2, FLAIR, DWI, GRE; sagittal T1; postcontrast 
axial, sagittal, and coronal T1. High resolution axial postcontrast T1-weighted 
images were obtained per [employer] protocol. COMPARISON: MRI brain 10/16/2018, 
10/12/2018 FINDINGS:There is interval mild retraction of the leftfrontal lobe 
operative cavity, near complete resolution of pneumocephalus, and decreased 
left frontal lobe T2/FLAIR signal abnormality suggesting improved edema. There 
is greater irregular enhancement along the operative cavity margins, thickest 
laterally and inferiorly where it measures up to 7 mm. This probably reflects 
evolving granulation changes. Residual neoplasm cannot be excluded. A small 
extra axial collection left frontal craniotomy site measures up to 8 mm with 
adjacent dural enhancement suggesting reactive granulation changes. There is 
improved mass effect with 2-3 mm rightward midline shift at the frontal falx 
level. There is no hydrocephalus. There is left frontal operative site 
hemosiderin is seen. Staining and mild residual ischemic edema. No new/acute 
ischemia is seen. The major vascular flow voids are maintained. There is stable 
mild cerebellar tonsillar ectopia. There is mild chronic polypoid sinus mucosal 
disease with well aerated mastoid air cells. The sella and orbits are 
unremarkable. There is no craniotomy offset or remarkable subgaleal collection. 
IMPRESSION: 1. Since 10/16/2018, evolution of postoperative changes with mild 
left frontal operative cavity contraction and improved surrounding edema 
pattern. Greater cavity margin enhancement likely reflects granulation changes, 
though residual neoplasm cannot be excluded. Advise attention at MR follow up. 
2. Improved mass effect with minimal current midline shift. Signed: Derrek Steve MDReport Verified Date/Time:  2018 15:08:03 Reading Location: 
[address] Neuro Reading Room   Electronically signed by:DERREK STEVE M.D. on 2018 03:08 [id number] 11:54:00





 Test Item  Value  Reference Range  Comments

 

 POC-CREATININE (BEAKER)  0.8 mg/dL  0.6-1.3  TESTED AT [facility] 2491



 (test code=1859)      [facility]



       [id number]

 

 POC-EGFR (BEAKER) (test  72 mL/min/1.73M2    



 code=1860)      



TISSUE [id number] 12:18:00Surgical Pathology Report                         
Case: [id number]                                 Authorizing Provider:  Dennis Alvarez MD            Collected:           10/15/2018 1216            Ordering 
Location:     [facility] PERIOPERATIVE         Received:            10/15/2018 1220  
            SERVICES                                                           
        Pathologist:           Kalpesh Rod MD                             
                           Specimens:   A) - Tumor, left frontal brain mass    
                                                           B) -Tumor, left 
frontal tumor                                                       Additionally
, inhibin and CD56 immunoperoxidase stains were performed. CD56 stains 
background neuropil and a rare tumor cell. Inhibin is negative in [id number] x 
2 additionallyAddendum electronically signed by Kalpesh Rod MD on 10/24/
2018 at 12:18 PMA. BRAIN, LEFT FRONTAL, CRANIOTOMY:METASTATIC CARCINOMA (SEE 
COMMENT)B. BRAIN, LEFT FRONTAL, CRANIOTOMY:METASTATIC CARCINOMA (SEE COMMENT)  
    Signing Pathologist Direct Phone Line: [phone] signed by 
Kalpesh Rod MD on 10/23/2018 at  5:57 PMBoth specimens consist of 
metastatic carcinoma to brain. Immunoperoxidase stains for PAX-8, CAM 5.2 and 
estrogen receptors are strongly and diffusely positive in tumor cells. The Ki67 
proliferation index is 80-90% in most areas. Immunoperoxidase stains for p63, 
synaptophysin, RCC, GCDFP, WT1, napsin A, cytokeratin 7, cytokeratin 20, 
mammoglobin, MONROE-3, CK 5/6, , and TTF-1 are negative in tumor. The 
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 
of Mullerian origin. Clinical correlation is recommended.88307 x 2; 34435; 86232
; 88341 x 15; 54239Mkhdbtt/periuterine massLeft front brain mass; left frontal 
tumorA. Received fresh for intraoperative frozend consultation labeled "tumor" 
is a 1.5 x 1.0 x 0.5 cm aggregate of multiple irregular tan-pink portion of 
tissues whichare submitted for frozen section in cassette FSA1. The remainder 
of the specimen is submitted in A2. MW/plB. The specimen is received in a 
fluidless container labeled with patient information and labeled "left frontal 
tumor" and consists of multiple fragments of tan-red soft tissue measuring 2.5 
x 2.5 x 0.6 cm in aggregate. Submitted entirely  B1. CG/pl BRAIN, LEFT FRONTAL, 
BIOPSY OF MASS:  - METASTATIC CARCINOMA Reported by Dr. Rod to Dr. Alvarez on 
Oct 15, 2018 at 12:40 p.m. Performed on A and BThe following special studies 
were performed on this case and the interpretation is incorporated in the 
diagnostic report above:CK7, CK20, Cam 5.2, mammoglobin, MONROE-3, CK 5/6, 
, TTF-1, p63, PAX-8,synaptophysin, Ki-67, RCC, GCDFP, estrogen receptor, WT-1, 
and Napsin-AThe immunohistochemistry testwas developed and its performance 
characteristics determined by [facility], Pathology 
Laboratory. It has not been cleared or approved by the U.S. Food and Drug 
Administration. The FDAhas determined that such clearance or approval is not 
necessary. The test is used for clinical purposes. It should not be regarded as 
investigational or for research. This laboratory is certified underthe Clinical 
Laboratory Improvement Amendments of 1988 (CLIA-88) as qualified to perform 
high complexity clinical laboratory testing.BASIC METABOLIC PANEL2018-10-17 05:
52:00





 Test Item  Value  Reference Range  Comments

 

 SODIUM (BEAKER) (test  140 meq/L  136-145  



 kopr=547)      

 

 POTASSIUM (BEAKER) (test  3.8 meq/L  3.5-5.1  



 code=379)      

 

 CHLORIDE (BEAKER) (test  104 meq/L    



 code=382)      

 

 CO2 (BEAKER) (test  27 meq/L  22-29  



 code=355)      

 

 BLOOD UREA NITROGEN  18 mg/dL  7-21  



 (BEAKER) (test code=354)      

 

 CREATININE (BEAKER) (test  0.69 mg/dL  0.57-1.25  



 code=358)      

 

 GLUCOSE RANDOM (BEAKER)  120 mg/dL    



 (test code=652)      

 

 CALCIUM (BEAKER) (test  8.7 mg/dL  8.4-10.2  



 code=697)      

 

 EGFR (BEAKER) (test  86 mL/min/1.73 sq m    ESTIMATED GFR IS NOT AS



 code=1092)      ACCURATE AS CREATININE



       CLEARANCE IN PREDICTING



       GLOMERULAR FILTRATION



       RATE. ESTIMATED GFR IS



       NOT APPLICABLE FOR



       DIALYSIS PATIENTS.



HEMOGLOBIN AND HEMATOCRIT2018-10-17 05:24:00





 Test Item  Value  Reference Range  Comments

 

 HEMOGLOBIN (BEAKER) (test code=410)  9.1 GM/DL  11.2-15.7  

 

 HEMATOCRIT (BEAKER) (test code=411)  30.1 %  34.1-44.9  



MR, BRAIN, [id number] 16:25:00Prior to dischargeFINAL REPORT PATIENT ID:   
[id number] MRI Brain with and without contrast 10/16/2018 4:22 PM CLINICAL HISTORY
: post operative TECHNIQUE: Multiplanar, multisequence MR imaging of the brain 
was performed, utilizing the following imaging sequences: Axial T1, T2, FLAIR, 
GRE, DWI/ADC; sagittal T1; postcontrast axial, sagittal, and coronal T1. 
COMPARISON: 10/12/2018. FINDINGS: No residual enhancing tumor is evident. 
Nonenhancing signal abnormality in the left frontal lobe is stable.  There has 
been marginalrelief of mass effect, with midline shift now measuring 6 mm at 
the septi pellucidi (previously 9 mm). There is persistent subfalcine 
protrusion of the paramedian left frontal lobe.  There is no organized hematoma
, arterial or venous distribution infarct, hydrocephalus, or concerning extra-
axial collection. There is no craniotomy offset or remarkable subgaleal 
collection. Normal appearing flow-voids remain present in the major 
intracranial vascular structures. IMPRESSION: 1. Negative for residual 
enhancing tumor.2. Nonworrisome postoperative appearance. Signed: Seipel, Timothy MDReport Verified Date/Time:  10/16/2018 16:25:22 Reading Location: [facility] Radiology Reading Room    Electronicallysigned by: TIMOTHY J SEIPEL, M.D. on 10/16/2018 04:25 PMHEMOGLOBIN AND HEMATOCRIT2018-10-16 15:49:00





 Test Item  Value  Reference Range  Comments

 

 HEMOGLOBIN (BEAKER) (test code=410)  8.8 GM/DL  11.2-15.7  

 

 HEMATOCRIT (BEAKER) (test code=411)  29.1 %  34.1-44.9  



TISSUE [id number] 11:28:00Surgical Pathology Report                         
Case: [id number]                                 Authorizing Provider:  Lora Wagner, Collected:           10/12/2018 Xiomara REARDON   
                                                                      
OrderingLocation:     [place] [address] ICU        Received:            10/12/
2018 [address]            Pathologist:           Camille Espinosa MD          
                                        Specimen:    Curettings, Endometrial, 
endometrial bx                                              ENDOMETRIUM, 
CURETTAGE:  - PREDOMINANTLY BLOOD WITH VERY SMALL FRAGMENTS OF ATYPICAL AND 
CROWDED GLANDS (See Comment)    Signing Pathologist Direct Phone Line: [phone]
[phone] signed by Camille Espinosa MD on 10/16/2018 at 11:28 
AMThe biopsy is suboptimal and the findings are worrisome. Procurement of 
additional tissue is recommended.88305 x 1 Endometrial curettingsThe specimen 
is received in a formalin-filled container and labeled with the patient's 
information labeled "endometrial curettings" andconsists of bloody soft tissue 
measuring 1.5 x 0.4 x 0.2 cm in aggregate. Submitted entirely A1. CG/pl 
Performed.BASIC METABOLIC [id number] 04:14:00





 Test Item  Value  Reference Range  Comments

 

 SODIUM (BEAKER) (test  140 meq/L  136-145  



 jjol=642)      

 

 POTASSIUM (BEAKER) (test  4.1 meq/L  3.5-5.1  



 code=379)      

 

 CHLORIDE (BEAKER) (test  107 meq/L    



 code=382)      

 

 CO2 (BEAKER) (test  24 meq/L  22-29  



 code=355)      

 

 BLOOD UREA NITROGEN  15 mg/dL  7-21  



 (BEAKER) (test code=354)      

 

 CREATININE (BEAKER) (test  0.71 mg/dL  0.57-1.25  



 code=358)      

 

 GLUCOSE RANDOM (BEAKER)  127 mg/dL    



 (test code=652)      

 

 CALCIUM (BEAKER) (test  8.5 mg/dL  8.4-10.2  



 code=697)      

 

 EGFR (BEAKER) (test  83 mL/min/1.73 sq m    ESTIMATED GFR IS NOT AS



 code=1092)      ACCURATE AS CREATININE



       CLEARANCE IN PREDICTING



       GLOMERULAR FILTRATION



       RATE. ESTIMATED GFR IS



       NOT APPLICABLE FOR



       DIALYSIS PATIENTS.



CBC (HEMOGRAM ONLY)2018-10-16 04:08:00





 Test Item  Value  Reference Range  Comments

 

 WHITE BLOOD CELL COUNT  14.2 K/ L  3.5-10.5  



 (BEAKER) (test code=775)      

 

 RED BLOOD CELL COUNT (BEAKER)  3.79 M/ L  3.93-5.22  



 (test code=761)      

 

 HEMOGLOBIN (BEAKER) (test  8.4 GM/DL  11.2-15.7  



 code=410)      

 

 HEMATOCRIT (BEAKER) (test  28.2 %  34.1-44.9  



 code=411)      

 

 MEAN CORPUSCULAR VOLUME  74.4 fL  79.4-94.8  



 (BEAKER) (test code=753)      

 

 MEAN CORPUSCULAR HEMOGLOBIN  22.2 pg  25.6-32.2  



 (BEAKER) (test code=751)      

 

 MEAN CORPUSCULAR HEMOGLOBIN  29.8 GM/DL  32.2-35.5  



 CONC (BEAKER) (test code=752)      

 

 RED CELL DISTRIBUTION WIDTH   %  11.7-14.4  Unable to report due to



 (BEAKER) (test code=412)      abnormal RBC population



       distribution.

 

 PLATELET COUNT (BEAKER) (test  220 K/CU MM  150-450  



 awaa=391)      

 

 MEAN PLATELET VOLUME (BEAKER)  10.8 fL  9.4-12.3  



 (test code=754)      

 

 NUCLEATED RED BLOOD CELLS  0 /100 WBC  0-0  



 (BEAKER) (test code=413)      



POCT-GLUCOSE [id number] 08:16:00





 Test Item  Value  Reference Range  Comments

 

 POC-GLUCOSE METER (BEAKER)  114 mg/dL    TESTED AT [facility] [address] [facility]



 (test sfun=4983)      [facility] [address]



CBC W/PLT COUNT &amp; AUTO DIFFERENTIAL2018-10-15 07:35:00





 Test Item  Value  Reference Range  Comments

 

 WHITE BLOOD CELL COUNT  9.9 K/ L  3.5-10.5  



 (BEAKER) (test code=775)      

 

 RED BLOOD CELL COUNT (BEAKER)  4.48 M/ L  3.93-5.22  



 (test code=761)      

 

 HEMOGLOBIN (BEAKER) (test  9.8 GM/DL  11.2-15.7  



 code=410)      

 

 HEMATOCRIT (BEAKER) (test  33.3 %  34.1-44.9  



 code=411)      

 

 MEAN CORPUSCULAR VOLUME  74.3 fL  79.4-94.8  



 (BEAKER) (test code=753)      

 

 MEAN CORPUSCULAR HEMOGLOBIN  21.9 pg  25.6-32.2  



 (BEAKER) (test code=751)      

 

 MEAN CORPUSCULAR HEMOGLOBIN  29.4 GM/DL  32.2-35.5  



 CONC (BEAKER) (test code=752)      

 

 RED CELL DISTRIBUTION WIDTH   %  11.7-14.4  Unable to report due to



 (BEAKER) (test code=412)      abnormal RBC population



       distribution.

 

 PLATELET COUNT (BEAKER) (test  226 K/CU MM  150-450  



 yoiz=552)      

 

 MEAN PLATELET VOLUME (BEAKER)  11.6 fL  9.4-12.3  



 (test code=754)      

 

 NUCLEATED RED BLOOD CELLS  0 /100 WBC  0-0  



 (BEAKER) (test code=413)      

 

 NEUTROPHILS RELATIVE PERCENT  88 %    



 (BEAKER) (test code=429)      

 

 LYMPHOCYTES RELATIVE PERCENT  8 %    



 (BEAKER) (test code=430)      

 

 MONOCYTES RELATIVE PERCENT  3 %    



 (BEAKER) (test code=431)      

 

 EOSINOPHILS RELATIVE PERCENT  0 %    



 (BEAKER) (test code=432)      

 

 BASOPHILS RELATIVE PERCENT  0 %    



 (BEAKER) (test code=437)      

 

 NEUTROPHILS ABSOLUTE COUNT  8.65 K/ L  1.56-6.13  



 (BEAKER) (test code=670)      

 

 LYMPHOCYTES ABSOLUTE COUNT  0.78 K/ L  1.18-3.74  



 (BEAKER) (test code=414)      

 

 MONOCYTES ABSOLUTE COUNT  0.31 K/ L  0.24-0.36  



 (BEAKER) (test code=415)      

 

 EOSINOPHILS ABSOLUTE COUNT  0.00 K/ L  0.04-0.36  



 (BEAKER) (test code=416)      

 

 BASOPHILS ABSOLUTE COUNT  0.02 K/ L  0.01-0.08  



 (BEAKER) (test code=417)      

 

 IMMATURE GRANULOCYTES-RELATIVE  1 %  0-1  



 PERCENT (BEAKER) (test      



 code=2801)      



CARCINOEMBRYONIC ANTIGEN (CEA)2018-10-15 06:17:00





 Test Item  Value  Reference Range  Comments

 

 CARCINOEMBRYONIC ANTIGEN (BEAKER) (test code=685)  1.6 ng/mL  0.0-5.0  



BASIC METABOLIC PANEL2018-10-15 05:58:00





 Test Item  Value  Reference Range  Comments

 

 SODIUM (BEAKER) (test  142 meq/L  136-145  



 tjpl=388)      

 

 POTASSIUM (BEAKER) (test  3.7 meq/L  3.5-5.1  



 code=379)      

 

 CHLORIDE (BEAKER) (test  107 meq/L    



 code=382)      

 

 CO2 (BEAKER) (test  25 meq/L  22-29  



 code=355)      

 

 BLOOD UREA NITROGEN  16 mg/dL  7-21  



 (BEAKER) (test code=354)      

 

 CREATININE (BEAKER) (test  0.72 mg/dL  0.57-1.25  



 code=358)      

 

 GLUCOSE RANDOM (BEAKER)  118 mg/dL    



 (test code=652)      

 

 CALCIUM (BEAKER) (test  9.0 mg/dL  8.4-10.2  



 code=697)      

 

 EGFR (BEAKER) (test  82 mL/min/1.73 sq m    ESTIMATED GFR IS NOT AS



 code=1092)      ACCURATE AS CREATININE



       CLEARANCE IN PREDICTING



       GLOMERULAR FILTRATION



       RATE. ESTIMATED GFR IS



       NOT APPLICABLE FOR



       DIALYSIS PATIENTS.



PT/APTT2018-10-15 05:40:00





 Test Item  Value  Reference Range  Comments

 

 PROTIME (BEAKER) (test code=759)  14.1 seconds  11.7-14.7  

 

 INR (BEAKER) (test code=370)  1.1  <=5.9  

 

 PARTIAL THROMBOPLASTIN TIME (BEAKER) (test  25.6 seconds  22.5-36.0  



 code=760)      



RECOMMENDED COUMADIN/WARFARIN INR THERAPY RANGESSTANDARD DOSE: 2.0 - 3.0   
Includes: PROPHYLAXIS forvenous thrombosis, systemic embolization; TREATMENT 
for venous thrombosis and/or pulmonary embolus.HIGH RISK: Target INR is 2.5-3.5 
for patients with mechanical heart valves.PROTHROMBIN TIME/INR2018-10-15 05:39:
00





 Test Item  Value  Reference Range  Comments

 

 PROTIME (BEAKER) (test code=759)  14.1 seconds  11.7-14.7  

 

 INR (BEAKER) (test code=370)  1.1  <=5.9  



RECOMMENDED COUMADIN/WARFARIN INR THERAPY RANGESSTANDARD DOSE: 2.0 - 3.0   
Includes: PROPHYLAXIS forvenous thrombosis, systemic embolization; TREATMENT 
for venous thrombosis and/or pulmonary embolus.HIGH RISK: Target INR is 2.5-3.5 
for patients with mechanical heart valves.POCT-GLUCOSE METER2018-10-14 21:36:00





 Test Item  Value  Reference Range  Comments

 

 POC-GLUCOSE METER (BEAKER)  164 mg/dL    TESTED AT [address]



 (test crca=0644)      [facility] [address]



POCT-GLUCOSE METER2018-10-14 17:15:00





 Test Item  Value  Reference Range  Comments

 

 POC-GLUCOSE METER (BEAKER)  142 mg/dL    TESTED AT [address]



 (test wlxr=9349)      [facility] [address]



POCT-GLUCOSE [id number] 12:18:00





 Test Item  Value  Reference Range  Comments

 

 POC-GLUCOSE METER (BEAKER)  103 mg/dL    TESTED AT [address]



 (test xkin=6604)      [facility] [address]



POCT-GLUCOSE METER2018-10-14 08:14:00





 Test Item  Value  Reference Range  Comments

 

 POC-GLUCOSE METER (BEAKER)  144 mg/dL    TESTED AT [facility] [address] [facility] [place]



 (test gzld=5873)      [facility] [address]



POCT-GLUCOSE [id number] 23:40:00





 Test Item  Value  Reference Range  Comments

 

 POC-GLUCOSE METER (BEAKER)  153 mg/dL    TESTED AT [facility] [address] [facility] [place]



 (test gvju=1426)      [facility] [address]



U/S, ENDOVAGINAL (EV)2018-10-13 23:24:00Reason for exam:-&gt;uterine massFINAL 
REPORT PATIENT ID:   [id number]  Pelvis ultrasound, transabdominal and 
transvaginal exams Clinical History: uterine mass Discussion: Sonographic 
evaluation of the pelvis was performed transabdominally and transvaginally. 
There is no prior study for direct comparison. Correlation is made with recent 
abdominal CT 10/12/2018. The uterus measures 12.9 x 8.7 x 9.4 cm. The 
myometrium is heterogeneous. There is a heterogeneously hypoechoic mass in the 
uterus measuring 11.4 x 8.3 x 8.4 cm. The endometrium is obscured and not 
visualized. There is complex fluid distending the lower endometrial and 
endocervical canals, which may represent blood/blood products. The cervix is 
long and closed. The left adnexa is enlarged for patient age measuring 6.3 x 
3.7 x 4.7 cm, which contains soft tissue not typical for ovarian parenchyma, 
with a 3.2 x 3 x 2.8 cm cystic component. The right ovary was not visualized 
due to obscuration by bowel gas. There is no pelvic free fluid. Impression: 
11.4 x 8.3 x 8.4 cm uterine mass. Differential diagnosis is endometrial 
carcinoma versus fibroid.  Enlarged left adnexa for patient age containing 
solid and cystic components for which ovarian malignancy cannot be excluded. 
GYN evaluation is recommended. A pelvic MRI would be helpful for further 
evaluation and may be obtained as clinically warranted. Complex fluid mildly 
distending the lower endometrial and endocervical cavities may represent blood/
blood products. Clinical correlation is recommended. No pelvic free fluid. 
Signed: Lynn Urban MDReport Verified Date/Time:  10/13/2018 23:24:40 Reading 
Location: [address] CT Body Reading Room    Electronically signed by: LYNN URBAN MD on 10/13/2018 11:24 PMU/S, PELVIS2018-10-13 23:24:00Reason 
for exam:-&gt;uterine massFINAL REPORT PATIENT ID:   [id number]  Pelvis ultrasound
, transabdominal and transvaginal exams Clinical History: uterine mass 
Discussion: Sonographic evaluation of the pelvis was performed transabdominally 
and transvaginally. There is no prior study for direct comparison. Correlation 
is made with recent abdominal CT 10/12/2018. The uterus measures 12.9 x 8.7 x 
9.4 cm. The myometrium is heterogeneous. There is a heterogeneously hypoechoic 
mass in the uterus measuring 11.4 x 8.3 x 8.4 cm. The endometrium is obscured 
and not visualized. There is complex fluid distending the lower endometrial and 
endocervical canals, which may represent blood/blood products. The cervix is 
long and closed. The left adnexa is enlarged for patient age measuring 6.3 x 
3.7 x 4.7 cm, which contains soft tissue not typical for ovarian parenchyma, 
with a 3.2 x 3 x 2.8 cm cystic component. The right ovary was not visualized 
due to obscuration by bowel gas. There is no pelvic free fluid. Impression: 
11.4 x 8.3 x 8.4 cm uterine mass. Differential diagnosis is endometrial 
carcinoma versus fibroid.  Enlarged left adnexa for patient age containing 
solid and cystic components for which ovarian malignancy cannot be excluded. 
GYN evaluation is recommended. A pelvic MRI would be helpful for further 
evaluation and may be obtained as clinically warranted. Complex fluid mildly 
distending the lower endometrial and endocervical cavities may represent blood/
blood products. Clinical correlation is recommended. No pelvic free fluid. 
Signed: Lynn Urban MDReport Verified Date/Time:  10/13/2018 23:24:40 Reading 
Location: [facility] C013Y CT Body Reading Room    Electronically signed by: LYNN URBAN MD on 10/13/2018 11:24 PMPOCT-GLUCOSE METER2018-10-13 17:04:00





 Test Item  Value  Reference Range  Comments

 

 POC-GLUCOSE METER (BEAKER)  123 mg/dL    TESTED AT [facility] [address] [facility] [place]



 (test aecy=9194)      [facility] [address]



POCT-GLUCOSE [id number] 11:40:00





 Test Item  Value  Reference Range  Comments

 

 POC-GLUCOSE METER (BEAKER)  211 mg/dL    TESTED AT [facility] [address] [facility]



 (test jzcg=2935)      [facility] [address]



POCT-GLUCOSE METER2018-10-12 22:02:00





 Test Item  Value  Reference Range  Comments

 

 POC-GLUCOSE METER (BEAKER)  127 mg/dL    TESTED AT [facility] [address] [facility]



 (test chzc=3263)      [facility] [address]



MR, BRAIN, [id number] 19:45:00Stealth protocolFINAL REPORT PATIENT ID:   
[id number] MRI Brain with and without contrast Stealth protocol 10/12/2018 7:41 
PM CLINICAL HISTORY: L frontal mass TECHNIQUE: Multiplanar, multisequence MR 
imaging of the brain was performed, utilizing the following imaging sequences: 
Axial T1, T2, FLAIR, GRE, DWI/ADC; sagittal T1; postcontrast axial, sagittal, 
and coronal T1. Thin section axial T2 FLAIR and pre and postcontrast T1 3-D FFE 
imaging sequences were obtained for intraoperative neuronavigation purposes. 
COMPARISON: None available. FINDINGS: There is a centrally necrotic tumor with 
internal hemorrhage and substantial surrounding edema in the left frontal lobe 
measuring 4.8 cm AP by 4.1 cm transverse by 4.5 cm craniocaudal. Mass effect 
results in 9 mm midline shift, measured at the septi pellucidi, with subfalcine 
herniation of the paramedian left frontal lobe. There is no ventricular 
entrapment. No additionallesions are evident. There is no infarct, hematoma, 
hydrocephalus, or extra-axial collection. Normalappearing flow-voids are 
present in the major intracranial vascular structures. The sellar and pineal 
regions, craniocervical junction, orbits, face, and skull base are without 
worrisome finding. 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 
glioblastoma multiforme with associated moderate mass effect. Signed: Seipel, Timothy MDReport Verified Date/Time:  10/12/2018 19:45:19 Reading Location: [facility] Radiology Reading Room    Electronically signed by: TIMOTHY J SEIPEL
, M.D. on 10/12/2018 07:45 PMEEG AWAKE AND DROWSY2018-10-12 10:10:00Reason for 
exam:-&gt;L frontal mass, c/f seizuresShould this be performed at the bedside?-&
gt;YesDATE OF TEST: [id number] OF REPORT: [id number]: [id number]: [id number]-
1934Start time: 08:32Stop time: 08:52ICD-10: R 56.9CPT Code: 99615VZEWBEA: 62 yo female with confusion with decline in memory and frontal mass.MEDICATIONS: 
morphine, decadronTECHNICAL SUMMARY: This is a digital video EEG recorded with 
32 input channels reviewed with bipolar and referential montages using the 
modified combinatorialsystem nomenclature. DESCRIPTION OF RECORD: During the 
maximally alert state, a 9.5 Hz posterior dominant rhythm was seen that was 
symmetric and reactive. Intermittent 1-2 Hz delta slowing was seen over the 
left hemisphere, maximal left centroparietal region. More anteriorly, low 
voltage frontocentralbeta predominated. Drowsiness was characterized by alpha 
attenuation and increased frontocentral theta. The delta slowing over the left 
hemisphere was more prominent during drowsiness. Stage 2 sleep architecture was 
seen including K-complexes and sleep spindles.HV: Hyperventilation was not 
performed. PHOTIC STIMULATION: Photic stimulation was not performed.VIDEO 
EVENTS RECORDED: noneELECTROCARDIOGRAMEVENTS: none IMPRESSION: Abnormal awake 
and sleep EEGIntermittent slow, lateralized left hemisphere CLINICAL CORRELATION
: This EEG is consistent with focal dysfunction over the left hemisphere. An 
EEG without epileptiform discharges does not exclude the possibility of 
epilepsy. If the clinical suspicion of epilepsy remains, consider additional 
EEG recordings.  Stalin Horner MDNeurophysiology Fellow Eder Ribeiro MD, 
MSClinical Neurophysiology/Epilepsy Attending     Electronically signed by: EDER RIBEIRO MD on 10/12/2018 10:10 AMCT, CHEST, WITH CONTRAST2018-10-12 07:54
:00FINAL REPORT PATIENT ID:   [id number] CT scan of the chest, abdomen and 
pelvis. Clinical history: Neoplasm of neck, metastatic, unknown. COMPARISON 
STUDY: None available. TECHNIQUE: Contiguous helical slices were acquired 
through the chest, abdomen and pelvis post administration of intravenous 
contrast.No oral contrast was administered. This exam was performed according 
to our department dose optimization program which includes automated exposure 
control, adjustment of the mA and/or kV according to the patient's size and/or 
use of iterative reconstruction technique. FINDINGS: The mediastinum 
demonstrates no suspicious masses or adenopathy. There are no pleural 
effusions. The tracheobronchial tree is clear with no endobronchial lesions. 
The pulmonary parenchyma demonstrates multiple small nodules suspicious for 
metastatic disease. These include a 5 mm nodule in the left upper lobe on image 
15, 8 mm nodule in the left lower lobe on image 23, 5 mm nodule in the right 
upper lobe on image 14, and 4 mm nodule in the left lower lobe on image 18. The 
liver, spleen, pancreas, and kidneys are unremarkable. A 2.1 x 2.0 cm cyst is 
seen in the upper pole of the left kidney. The gallbladder and biliary treeare 
unremarkable. Postsurgical changes are seen related to a gastric sleeve. There 
are no dilated loops of bowel seen to suggest obstruction. Within the pelvis is 
a 13.8 x 9.9 cm mass emanating from the uterus highly suspicious for uterine 
carcinoma. It extends towards the left adnexal region. Within the left adnexa 
is a 3.4 x 3.3 cm cyst, likely ovarian in nature. No free fluid or free air is 
seen. The aorta is normal in caliber. Atherosclerosis is seen. No 
retroperitoneal adenopathy is seen. Bone windows demonstrate degenerative 
changes with a levorotoscoliosis. IMPRESSION:1. Large uterine mass extending to 
the left adnexa likely involving the left ovary. Endometrial carcinoma would be 
highest in the differential.2. Multiple pulmonary nodules suspicious for 
metastatic disease.3. Status post gastric sleeve. Signed: Von Cannon 
MDReport Verified Date/Time:  10/12/2018 07:54:27 Reading Location: [facility] 
Diagnostic Imaging Reading Room - [place] [address]      Electronically signed by: 
VON CANNON M.D. on 10/12/2018 07:54 [facility] [place]2018-10-12 07:54:00FINAL 
REPORT PATIENT ID:   [id number] CT scan of the chest, abdomen and pelvis. 
Clinical history: Neoplasm of neck, metastatic, unknown. COMPARISON STUDY: None 
available. TECHNIQUE: Contiguous helical slices were acquired through the chest
, abdomen and pelvis post administration of intravenous contrast.No oral 
contrast was administered. This exam was performed according to our department 
dose optimization program which includes automated exposure control, adjustment 
of the mA and/or kV according to the patient's size and/or use of iterative 
reconstruction technique. FINDINGS: The mediastinum demonstrates no suspicious 
masses or adenopathy. There are no pleural effusions. The tracheobronchial tree 
is clear with no endobronchial lesions. The pulmonary parenchyma demonstrates 
multiple small nodules suspicious for metastatic disease. These include a 5 mm 
nodule in the left upper lobe on image 15, 8 mm nodule in the left lower lobe 
on image 23, 5 mm nodule in the right upper lobe on image 14, and 4 mm nodule 
in the left lower lobe on image 18. The liver, spleen, pancreas, and kidneys 
are unremarkable. A 2.1 x 2.0 cm cyst is seen in the upper pole of the left 
kidney. The gallbladder and biliary treeare unremarkable. Postsurgical changes 
are seen related to a gastric sleeve. There are no dilated loops of bowel seen 
to suggest obstruction. Within the pelvis is a 13.8 x 9.9 cm mass emanating 
from the uterus highly suspicious for uterine carcinoma. It extends towards the 
left adnexal region. Within the left adnexa is a 3.4 x 3.3 cm cyst, likely 
ovarian in nature. No free fluid or free air is seen. The aorta is normal in 
caliber. Atherosclerosis is seen. No retroperitoneal adenopathy is seen. Bone 
windows demonstrate degenerative changes with a levorotoscoliosis. IMPRESSION:
1. Large uterine mass extending to the left adnexa likely involving the left 
ovary. Endometrial carcinoma would be highest in the differential.2. Multiple 
pulmonary nodules suspicious for metastatic disease.3. Status post gastric 
sleeve. Signed: Von Cannon MDReport Verified Date/Time:  10/12/2018 07:54:
27 Reading Location: [facility] Diagnostic Imaging Reading Room - [place] [address]      
Electronically signed by: VON CANNON M.D. on 10/12/2018 07:54 
[id number] 04:33:00





 Test Item  Value  Reference Range  Comments

 

 PHOSPHORUS (BEAKER) (test code=604)  3.6 mg/dL  2.3-4.7  



MAGNESIUM2018-10-12 04:33:00





 Test Item  Value  Reference Range  Comments

 

 MAGNESIUM (BEAKER) (test code=627)  2.2 mg/dL  1.6-2.6  



BASIC METABOLIC PANEL2018-10-12 04:33:00





 Test Item  Value  Reference Range  Comments

 

 SODIUM (BEAKER) (test  142 meq/L  136-145  



 xgof=269)      

 

 POTASSIUM (BEAKER) (test  3.9 meq/L  3.5-5.1  



 code=379)      

 

 CHLORIDE (BEAKER) (test  108 meq/L    



 code=382)      

 

 CO2 (BEAKER) (test  23 meq/L  22-29  



 code=355)      

 

 BLOOD UREA NITROGEN  12 mg/dL  7-21  



 (BEAKER) (test code=354)      

 

 CREATININE (BEAKER) (test  0.72 mg/dL  0.57-1.25  



 code=358)      

 

 GLUCOSE RANDOM (BEAKER)  139 mg/dL    



 (test code=652)      

 

 CALCIUM (BEAKER) (test  10.1 mg/dL  8.4-10.2  



 code=697)      

 

 EGFR (BEAKER) (test  82 mL/min/1.73 sq m    ESTIMATED GFR IS NOT AS



 code=1092)      ACCURATE AS CREATININE



       CLEARANCE IN PREDICTING



       GLOMERULAR FILTRATION



       RATE. ESTIMATED GFR IS



       NOT APPLICABLE FOR



       DIALYSIS PATIENTS.



PROTHROMBIN TIME/INR2018-10-12 04:14:00





 Test Item  Value  Reference Range  Comments

 

 PROTIME (BEAKER) (test code=759)  14.5 seconds  11.7-14.7  

 

 INR (BEAKER) (test code=370)  1.1  <=5.9  



RECOMMENDED COUMADIN/WARFARIN INR THERAPY RANGESSTANDARD DOSE: 2.0 - 3.0   
Includes: PROPHYLAXIS forvenous thrombosis, systemic embolization; TREATMENT 
for venous thrombosis and/or pulmonary embolus.HIGH RISK: Target INR is 2.5-3.5 
for patients with mechanical heart valves.APTT2018-10-12 04:14:00





 Test Item  Value  Reference Range  Comments

 

 PARTIAL THROMBOPLASTIN TIME (BEAKER) (test  29.1 seconds  22.5-36.0  



 code=760)      



CBC W/PLT COUNT &amp; AUTO DIFFERENTIAL2018-10-12 04:11:00





 Test Item  Value  Reference Range  Comments

 

 WHITE BLOOD CELL COUNT (BEAKER) (test code=775)  5.7 K/ L  3.5-10.5  

 

 RED BLOOD CELL COUNT (BEAKER) (test code=761)  4.95 M/ L  3.93-5.22  

 

 HEMOGLOBIN (BEAKER) (test code=410)  10.7 GM/DL  11.2-15.7  

 

 HEMATOCRIT (BEAKER) (test code=411)  36.0 %  34.1-44.9  

 

 MEAN CORPUSCULAR VOLUME (BEAKER) (test code=753)  72.7 fL  79.4-94.8  

 

 MEAN CORPUSCULAR HEMOGLOBIN (BEAKER) (test  21.6 pg  25.6-32.2  



 zolm=358)      

 

 MEAN CORPUSCULAR HEMOGLOBIN CONC (BEAKER) (test  29.7 GM/DL  32.2-35.5  



 code=752)      

 

 RED CELL DISTRIBUTION WIDTH (BEAKER) (test  24.4 %  11.7-14.4  



 code=412)      

 

 PLATELET COUNT (BEAKER) (test code=756)  230 K/CU MM  150-450  

 

 MEAN PLATELET VOLUME (BEAKER) (test code=754)  10.3 fL  9.4-12.3  

 

 NUCLEATED RED BLOOD CELLS (BEAKER) (test  0 /100 WBC  0-0  



 code=413)      

 

 NEUTROPHILS RELATIVE PERCENT (BEAKER) (test  88 %    



 code=429)      

 

 LYMPHOCYTES RELATIVE PERCENT (BEAKER) (test  11 %    



 code=430)      

 

 MONOCYTES RELATIVE PERCENT (BEAKER) (test  1 %    



 code=431)      

 

 EOSINOPHILS RELATIVE PERCENT (BEAKER) (test  0 %    



 code=432)      

 

 BASOPHILS RELATIVE PERCENT (BEAKER) (test  0 %    



 code=437)      

 

 NEUTROPHILS ABSOLUTE COUNT (BEAKER) (test  4.98 K/ L  1.56-6.13  



 code=670)      

 

 LYMPHOCYTES ABSOLUTE COUNT (BEAKER) (test  0.62 K/ L  1.18-3.74  



 code=414)      

 

 MONOCYTES ABSOLUTE COUNT (BEAKER) (test  0.04 K/ L  0.24-0.36  



 code=415)      

 

 EOSINOPHILS ABSOLUTE COUNT (BEAKER) (test  0.00 K/ L  0.04-0.36  



 code=416)      

 

 BASOPHILS ABSOLUTE COUNT (BEAKER) (test  0.02 K/ L  0.01-0.08  



 code=417)      

 

 IMMATURE GRANULOCYTES-RELATIVE PERCENT (BEAKER)  1 %  0-1  



 (test code=2801)      



BASIC METABOLIC PANEL2018-10- 23:45:00





 Test Item  Value  Reference Range  Comments

 

 SODIUM (BEAKER) (test  142 meq/L  136-145  



 miqb=913)      

 

 POTASSIUM (BEAKER) (test  3.8 meq/L  3.5-5.1  



 code=379)      

 

 CHLORIDE (BEAKER) (test  108 meq/L    



 code=382)      

 

 CO2 (BEAKER) (test  21 meq/L  22-29  



 code=355)      

 

 BLOOD UREA NITROGEN  13 mg/dL  7-21  



 (BEAKER) (test code=354)      

 

 CREATININE (BEAKER) (test  0.74 mg/dL  0.57-1.25  



 code=358)      

 

 GLUCOSE RANDOM (BEAKER)  145 mg/dL    



 (test code=652)      

 

 CALCIUM (BEAKER) (test  9.6 mg/dL  8.4-10.2  



 code=697)      

 

 EGFR (BEAKER) (test  79 mL/min/1.73 sq m    ESTIMATED GFR IS NOT AS



 code=1092)      ACCURATE AS CREATININE



       CLEARANCE IN PREDICTING



       GLOMERULAR FILTRATION



       RATE. ESTIMATED GFR IS



       NOT APPLICABLE FOR



       DIALYSIS PATIENTS.

## 2020-01-22 NOTE — EKG
Test Date:    2020-01-22               Test Time:    08:04:17

Technician:   PAUL                                     

                                                     

MEASUREMENT RESULTS:                                       

Intervals:                                           

Rate:         75                                     

AZ:           164                                    

QRSD:         74                                     

QT:           380                                    

QTc:          424                                    

Axis:                                                

P:            38                                     

AZ:           164                                    

QRS:          -14                                    

T:            18                                     

                                                     

INTERPRETIVE STATEMENTS:                                       

                                                     

Normal sinus rhythm

Cannot rule out Anterior infarct, age undetermined

Abnormal ECG

Compared to ECG 10/11/2018 16:26:33

No significant changes



Electronically Signed On 01-22-20 13:58:16 CST by Matty Powers

## 2020-03-29 ENCOUNTER — HOSPITAL ENCOUNTER (EMERGENCY)
Dept: HOSPITAL 97 - ER | Age: 65
Discharge: ANOTHER HOSPITAL | End: 2020-03-29
Payer: COMMERCIAL

## 2020-03-29 DIAGNOSIS — Z85.42: ICD-10-CM

## 2020-03-29 DIAGNOSIS — G93.89: ICD-10-CM

## 2020-03-29 DIAGNOSIS — G93.6: Primary | ICD-10-CM

## 2020-03-29 DIAGNOSIS — Z85.841: ICD-10-CM

## 2020-03-29 LAB
BUN BLD-MCNC: 20 MG/DL (ref 7–18)
GLUCOSE SERPLBLD-MCNC: 130 MG/DL (ref 74–106)
HCT VFR BLD CALC: 37.2 % (ref 36–45)
LYMPHOCYTES # SPEC AUTO: 0.9 K/UL (ref 0.7–4.9)
MORPHOLOGY BLD-IMP: (no result)
PMV BLD: 9.3 FL (ref 7.6–11.3)
POTASSIUM SERPL-SCNC: 3.5 MMOL/L (ref 3.5–5.1)
RBC # BLD: 4.2 M/UL (ref 3.86–4.86)
UA COMPLETE W REFLEX CULTURE PNL UR: (no result)

## 2020-03-29 PROCEDURE — 87088 URINE BACTERIA CULTURE: CPT

## 2020-03-29 PROCEDURE — 85025 COMPLETE CBC W/AUTO DIFF WBC: CPT

## 2020-03-29 PROCEDURE — 70450 CT HEAD/BRAIN W/O DYE: CPT

## 2020-03-29 PROCEDURE — 81015 MICROSCOPIC EXAM OF URINE: CPT

## 2020-03-29 PROCEDURE — 82947 ASSAY GLUCOSE BLOOD QUANT: CPT

## 2020-03-29 PROCEDURE — 87077 CULTURE AEROBIC IDENTIFY: CPT

## 2020-03-29 PROCEDURE — 80048 BASIC METABOLIC PNL TOTAL CA: CPT

## 2020-03-29 PROCEDURE — 36415 COLL VENOUS BLD VENIPUNCTURE: CPT

## 2020-03-29 PROCEDURE — 87086 URINE CULTURE/COLONY COUNT: CPT

## 2020-03-29 PROCEDURE — 87186 SC STD MICRODIL/AGAR DIL: CPT

## 2020-03-29 PROCEDURE — 96374 THER/PROPH/DIAG INJ IV PUSH: CPT

## 2020-03-29 PROCEDURE — 81003 URINALYSIS AUTO W/O SCOPE: CPT

## 2020-03-29 PROCEDURE — 99285 EMERGENCY DEPT VISIT HI MDM: CPT

## 2020-03-29 NOTE — EDPHYS
Physician Documentation                                                                           

 [facility]                                                                 

Name: Mary Closs                                                                                  

Age: 64 yrs                                                                                       

Sex: Female                                                                                       

: 1955                                                                                   

MRN: [id number]                                                                                   

Arrival Date: 2020                                                                          

Time: 20:32                                                                                       

Account#: [id number]                                                                            

Bed 6                                                                                             

Private MD:                                                                                       

ED Physician Matt Diaz                                                                         

HPI:                                                                                              

                                                                                             

20:42 This 64 yrs old  Female presents to ER via Ambulatory with complaints of Can   rn  

      Not Finish Sentences.                                                                       

20:42 The patient presents with confusion, trouble concentrating. Onset: The symptoms/episode rn  

      began/occurred 2 day(s) ago. Possible causes: brain mass/edema. Associated signs and        

      symptoms: Pertinent positives: confusion, Pertinent negatives: abdominal pain, ataxia,      

      chest pain, combativeness, diaphoresis, headache, nausea, seizure, vertigo, vomiting.       

      Current symptoms: In the emergency department the patient's symptoms are unchanged from     

      the initial presentation. The patient has experienced similar episodes in the past. Has     

      had to brain surgeries due to brain mass and cerebral edema, both other times presented     

      with similar symptoms as today with trouble concentrating and not able to finish            

      thoughts/sentences. NO head injury or fall. No ETOH or drug use. No urinary symptoms.       

      No headache or vomiting. Has taken some left over steroids at home in case is swelling      

      again. .                                                                                    

                                                                                                  

Historical:                                                                                       

- Allergies:                                                                                      

21:20 No Known Allergies;                                                                     ea  

- PMHx:                                                                                           

20:41 uterine cancer with mets to brain;                                                      ll1 

- PSHx:                                                                                           

20:41 gastric sleeve; esophagus; Hysterectomy; tumor removed from brain;                      ll1 

                                                                                                  

- Immunization history:: Flu vaccine status is unknown.                                           

- Social history:: Patient/guardian denies using alcohol, street drugs, tobacco                   

  products, Smoking status: Patient denies any tobacco usage or history of.                       

- Family history:: not pertinent.                                                                 

- Hospitalizations: : Patient was recently seen at.                                               

                                                                                                  

                                                                                                  

ROS:                                                                                              

20:42 Constitutional: Negative for fever, chills, and weight loss, Eyes: Negative for injury, rn  

      pain, redness, and discharge, Neck: Negative for injury, pain, and swelling,                

      Cardiovascular: Negative for chest pain, palpitations, and edema, Respiratory: Negative     

      for shortness of breath, cough, wheezing, and pleuritic chest pain, Abdomen/GI:             

      Negative for abdominal pain, nausea, vomiting, diarrhea, and constipation,                  

      MS/Extremity: Negative for injury and deformity, Skin: Negative for injury, rash, and       

      discoloration, Neuro: Negative for headache, weakness, numbness, tingling, and seizure.     

                                                                                                  

Exam:                                                                                             

20:42 Constitutional:  This is a well developed, well nourished patient who is awake, alert,  rn  

      and in no acute distress.  Ambulatory to room without difficulty or distress.               

      Head/Face:  Normocephalic, atraumatic. Eyes:  Pupils equal round and reactive to light,     

      extra-ocular motions intact.  Lids and lashes normal.  Conjunctiva and sclera are           

      non-icteric and not injected.  Cornea within normal limits.  Periorbital areas with no      

      swelling, redness, or edema. ENT:  MMM Neck:  Trachea midline, no thyromegaly or masses     

      palpated, and no cervical lymphadenopathy.  Supple, full range of motion without nuchal     

      rigidity, or vertebral point tenderness.  No Meningismus. Cardiovascular:  Regular rate     

      and rhythm.  No pulse deficits. Respiratory:  No increased work of breathing, no            

      retractions or nasal flaring. Skin:  Warm, dry MS/ Extremity:  Pulses equal, no             

      cyanosis.   Neuro:  Awake and alert, GCS 15, oriented to person, place, time, and           

      situation.  Cranial nerves II-XII grossly intact.  Motor strength 5/5 in all                

      extremities.  Sensory grossly intact.  Cerebellar exam normal.  Normal gait.  Having        

      trouble finishing sentences but speech clear.                                               

                                                                                                  

Vital Signs:                                                                                      

20:39  / 88; Pulse 92; Resp 18; Temp 98.8; Pulse Ox 98% ;                               ll1 

21:33  / 61; Pulse 80; Resp 17 S; Pulse Ox 96% on R/A;                                  jd3 

22:10  / 62; Pulse 77; Resp 16; Pulse Ox 97% ;                                          ea  

22:27 Weight 99.79 kg; Height 5 ft. 1 in. (154.94 cm);                                        ea  

23:45  / 67; Pulse 69; Resp 17; Pulse Ox 99% ;                                          ah  

22:27 Body Mass Index 41.57 (99.79 kg, 154.94 cm)                                             ea  

                                                                                                  

NIH Stroke Scale Scores:                                                                          

20:42 NIHSS Score: 0                                                                          rn  

                                                                                                  

MDM:                                                                                              

20:33 Patient medically screened.                                                             rn  

20:46 ED course: No indication for TPA given known hx of brain mass, onset 2 days ago, and    rn  

      NIH 0..                                                                                     

21:32 Differential Diagnosis: CVA, intracranial bleed, brain mass, cerebral edema. Data       rn  

      reviewed: vital signs, nurses notes, lab test result(s), radiologic studies, CT scan,       

      and as a result, I will admit patient. Counseling: I had a detailed discussion with the     

      patient and/or guardian regarding: the historical points, exam findings, and any            

      diagnostic results supporting the discharge/admit diagnosis, lab results, radiology         

      results, the need to transfer to another facility, for higher level of care, [facility] does not immediately have the required specialist. ED course: CT with     

      new 4cm hypodense area, no bleed but unable to tell if new mass or just edema, or ex        

      vacuo. Will transfer to [facility] that is where she was seen for other brain           

      masses and had her surgical procedures. Decadron given IV here. Stable for transport. .     

                                                                                                  

                                                                                             

20:42 Order name: CBC with Diff                                                               rn  

                                                                                             

20:42 Order name: Basic Metabolic Panel; Complete Time: 21:46                                 rn  

                                                                                             

20:42 Order name: Urine Microscopic Only; Complete Time: 21:10                                rn  

                                                                                             

20:53 Order name: Urine Dipstick--Ancillary (enter results); Complete Time: 21:10             mw2 

                                                                                             

21:07 Order name: Urine Culture                                                               EDMS

                                                                                             

21:23 Order name: Glucose, Ancillary Testing; Complete Time: 21:46                            EDMS

                                                                                             

20:42 Order name: IV Start; Complete Time: 21:17                                              rn  

                                                                                             

20:42 Order name: CT Head Brain wo Cont; Complete Time: 21:23                                 rn  

                                                                                             

20:42 Order name: Glucose Level; Complete Time: 21:17                                         rn  

                                                                                             

21:47 Order name: Manual Differential                                                         EDMS

                                                                                                  

Administered Medications:                                                                         

21:10 Drug: Decadron - Dexamethasone 10 mg Route: IVP; Site: left antecubital;                ea  

22:29 Follow up: Response: No adverse reaction                                                ea  

                                                                                                  

                                                                                                  

Disposition:                                                                                      

20 21:36 Transfer ordered to [facility]. Diagnosis are           

  Cerebral edema, Recurrent Brain mass.                                                           

- Reason for transfer: Higher level of care.                                                      

- Accepting physician is Dr. Major.                                                           

- Condition is Stable.                                                                            

- Problem is new.                                                                                 

- Symptoms have improved.                                                                         

                                                                                                  

                                                                                                  

                                                                                                  

                                                                                                  

NIH Stroke Scale - NIH Stroke Score                                                               

Date: 2020                                                                                  

Time: 20:42                                                                                       

Total Score = 0                                                                                   

  1a. Level of Consciousness (LOC) - 0(Alert)                                                     

  1b. Level of Consciousness (LOC) (Year \T\ Age) - 0(Both)                                       

  1c. LOC Commands (Open \T\ Closes Eyes/) - 0(Both)                                          

   2. Best Gaze (Lateral Gaze Paresis) - 0(Normal)                                                

   3. Visual Field Loss - 0(No visual loss)                                                       

   4. Facial Palsy - 0(Normal)                                                                    

  5a. Left Arm: Motor (10-second hold) - 0(No drift)                                              

  5b. Right Arm: Motor (10-second hold) - 0(No drift)                                             

  6a. Left Leg: Motor (5-second hold - always test supine) - 0(No drift)                          

  6b. Right Leg: Motor (5-second hold - always test supine) - 0(No drift)                         

   7. Limb Ataxia (finger/nose \T\ heel/shin - test with eyes open) - 0(Absent)                   

   8. Sensory Loss (pinprick arms/legs/face) - 0(Normal)                                          

   9. Best Language: Aphasia (description/naming/reading) - 0(No aphasia)                         

  10. Dysarthria (speech clarity - read or repeat words) - 0(Normal)                              

  11. Extinction and Inattention (visual/tactile/auditory/spatial/personal) - 0(No                

      abnormality)                                                                                

Initials: rn                                                                                      

                                                                                                  

Signatures:                                                                                       

Dispatcher [employer]                           EDMS                                                 

Matt Diaz MD MD rn Antunez, Elena, RN RN ea Harris, Amy, RN RN ah Lewis, Lynsay, RN RN   ll1                                                  

                                                                                                  

Corrections: (The following items were deleted from the chart)                                    

22:02 21:36 2020 21:36 Transfer ordered to [facility]. Diagnosis is Cerebral edema; Recurrent Brain mass. Reason for transfer:             

      Higher level of care. Accepting physician is . Condition is Stable. Problem              

      is new. Symptoms have improved. rn                                                          

23:47 22:02 2020 21:36 Transfer ordered to [facility]. Diagnosis is Cerebral edema; Recurrent Brain mass. Reason for transfer:             

      Higher level of care. Accepting physician is Dr. Major. Condition is                    

      Stable. Problem is new. Symptoms have improved. rn                                          

                                                                                                  

**************************************************************************************************

## 2020-03-29 NOTE — RAD REPORT
EXAM DESCRIPTION:  CT - Head Brain Wo Cont - 3/29/2020 8:59 pm

 

CLINICAL HISTORY:  Confusion

 

COMPARISON:  January 2020

 

TECHNIQUE:  Computed axial tomography of the head was obtained. IV contrast was not requested.

 

All CT scans are performed using dose optimization technique as appropriate and may include automated
 exposure control or mA/KV adjustment according to patient size.

 

FINDINGS:  Craniotomy has been performed since the prior examination.

 

A 4.3 centimeter low-density structure has developed within the left frontal lobe.

 

Large amount of low-density is present within the white matter of the left frontal lobe crossing to t
he right via the corpus callosum. Compression upon the anterior aspect of the frontal horn of the lef
t lateral ventricle is seen. Shift of the midline structures 5 millimeters to the right is noted.

 

Curvilinear calcification of the dura of the left frontal lobe suspected.

 

IMPRESSION:  Since the prior examination there has been resection of a left frontal lobe mass.

 

4.3 centimeter low-density structure left frontal lobe could represent cystic encephalomalacia or cys
tic neoplasm.

 

Marked low density is present throughout the white matter of the left frontal lobe extending into the
 corpus callosum to the right frontal lobe. There is shift of midline structures 5 millimeters to the
 right.

 

Without contrast it is difficult to determine if residual/recurrent neoplasm is present. However, giv
en the marked low-density within the left frontal lobe white matter which may represent vasogenic raghav
ma it certainly is possible that there is residual/recurrent neoplasm present.

 

An enhanced MRI would be helpful for further evaluation

## 2020-03-29 NOTE — ER
Nurse's Notes                                                                                     

 [facility]                                                                 

Name: Mary Closs                                                                                  

Age: 64 yrs                                                                                       

Sex: Female                                                                                       

: 1955                                                                                   

MRN: [id number]                                                                                   

Arrival Date: 2020                                                                          

Time: 20:32                                                                                       

Account#: [id number]                                                                            

Bed 6                                                                                             

Private MD:                                                                                       

Diagnosis: Cerebral edema;Recurrent Brain mass                                                    

                                                                                                  

Presentation:                                                                                     

                                                                                             

20:39 Chief complaint: Patient states: Having trouble finishing sentences for a couple days.  ll1 

      Similar episode about two months ago. Gait steady. Ebola Screen: Patient denies travel      

      to an Ebola-affected area in the 21 days before illness onset. Initial Sepsis Screen:       

      Does the patient meet any 2 criteria? No. Patient's initial sepsis screen is negative.      

      Does the patient have a suspected source of infection? No. Patient's initial sepsis         

      screen is negative. Risk Assessment: Do you want to hurt yourself or someone else?          

      Patient reports no desire to harm self or others.                                           

20:39 Method Of Arrival: Ambulatory                                                           ll1 

20:39 Acuity: COREY 2                                                                           ll1 

21:22 Note  states that it started a few days ago. Onset of symptoms is unknown.       ea  

22:26 Coronavirus screen: Patient denies fever greater than 100.4F, cough, shortness of       ea  

      breath, or difficulty breathing. Proceed with normal triage process.                        

                                                                                                  

Historical:                                                                                       

- Allergies:                                                                                      

21:20 No Known Allergies;                                                                     ea  

- PMHx:                                                                                           

20:41 uterine cancer with mets to brain;                                                      ll1 

- PSHx:                                                                                           

20:41 gastric sleeve; esophagus; Hysterectomy; tumor removed from brain;                      ll1 

                                                                                                  

- Immunization history:: Flu vaccine status is unknown.                                           

- Social history:: Patient/guardian denies using alcohol, street drugs, tobacco                   

  products, Smoking status: Patient denies any tobacco usage or history of.                       

- Family history:: not pertinent.                                                                 

- Hospitalizations: : Patient was recently seen at.                                               

                                                                                                  

                                                                                                  

Screenin:42 Abuse screen: Denies threats or abuse. Nutritional screening: No deficits noted.        ll1 

      Tuberculosis screening: No symptoms or risk factors identified. Fall Risk.                  

                                                                                                  

Assessment:                                                                                       

21:17 General: Appears in no apparent distress. Behavior is calm, cooperative, appropriate    ea  

      for age, Reports  states that she is having trouble finishing sentences and last     

      time this happened she had swelling on her brain. Pain: Denies pain. Neuro: Level of        

      Consciousness is awake, alert, Oriented to person, place, time, situation,  are        

      equal bilaterally Speech normal speech in short sentences. Pupils are PERRLA, Denies        

      weakness dizziness. Cardiovascular: Denies chest pain, Heart tones S1 S2 present            

      Capillary refill < 3 seconds Patient's skin is warm and dry. Respiratory: Airway is         

      patent Respiratory effort is even, unlabored, Respiratory pattern is regular,               

      symmetrical. GI: Abdomen is non-distended, Bowel sounds present X 4 quads. : No signs     

      and/or symptoms were reported regarding the genitourinary system. EENT: No signs and/or     

      symptoms were reported regarding the EENT system. Derm: No signs and/or symptoms            

      reported regarding the dermatologic system. Musculoskeletal: No signs and/or symptoms       

      reported regarding the musculoskeletal system.                                              

22:10 Reassessment: Patient appears in no apparent distress at this time. Patient and/or      ea  

      family updated on plan of care and expected duration. Pain level reassessed. Patient is     

      alert, oriented x 3, equal unlabored respirations, skin warm/dry/pink. Patient denies       

      pain at this time.                                                                          

22:25 Reassessment: Attempted to call report to [facility], they stated that Sherif would    ea  

      call back in 10-15 minutes.                                                                 

22:47 Reassessment: Report given to DALIA solis at [facility].                                  

23:44 Reassessment: EMS to transport to [facility]. Pt denies any needs.  informed of      

      plan and transfer.                                                                          

                                                                                                  

Vital Signs:                                                                                      

20:39  / 88; Pulse 92; Resp 18; Temp 98.8; Pulse Ox 98% ;                               ll1 

21:33  / 61; Pulse 80; Resp 17 S; Pulse Ox 96% on R/A;                                  jd3 

22:10  / 62; Pulse 77; Resp 16; Pulse Ox 97% ;                                          ea  

22:27 Weight 99.79 kg; Height 5 ft. 1 in. (154.94 cm);                                        ea  

23:45  / 67; Pulse 69; Resp 17; Pulse Ox 99% ;                                            

22:27 Body Mass Index 41.57 (99.79 kg, 154.94 cm)                                               

                                                                                                  

NIH Stroke Scale Scores:                                                                          

20:42 NIHSS Score: 0                                                                          rn  

                                                                                                  

ED Course:                                                                                        

20:30 Arm band placed on Patient placed in an exam room, on a stretcher, on pulse oximetry,   ll1 

      Dr. Diaz to bedside.                                                                       

20:32 Patient arrived in ED.                                                                  ag3 

20:33 Diaz, Matt, MD is Attending Physician.                                                rn  

20:40 Triage completed.                                                                       ll1 

20:45 Pam Coats, DALIA is Primary Nurse.                                                    ea  

20:59 CT completed. Patient tolerated procedure well. Patient moved back from CT.             mw3 

20:59 CT Head Brain wo Cont In Process Unspecified.                                           EDMS

21:10 Missed attempt(s): 20 gauge in right antecubital area. Bleeding controlled, band aid    ea  

      applied, catheter tip intact.                                                               

21:20 Missed attempt(s): 20 gauge Bleeding controlled, band aid applied, catheter tip intact. oe  

21:22 Patient has correct armband on for positive identification. Placed in gown. Bed in low  ea  

      position. Call light in reach. Side rails up X2. Adult w/ patient.                          

21:25 Inserted saline lock: 22 gauge in left antecubital area, using aseptic technique. Blood oe  

      collected.                                                                                  

22:27 transfer approval from receiving facility.                                              ea  

23:44 No provider procedures requiring assistance completed. Patient transferred, IV remains  ah  

      in place. intact.                                                                           

                                                                                                  

Administered Medications:                                                                         

21:10 Drug: Decadron - Dexamethasone 10 mg Route: IVP; Site: left antecubital;                ea  

22:29 Follow up: Response: No adverse reaction                                                ea  

                                                                                                  

                                                                                                  

Outcome:                                                                                          

21:36 ER care complete, transfer ordered by MD.                                               rn  

23:43 Transferred by ground EMS to [facility], Transfer form completed.      

23:43 Condition: stable                                                                           

23:43 Instructed on the need for transfer, Demonstrated understanding of instructions.            

23:47 Patient left the ED.                                                                      

                                                                                                  

                                                                                                  

NIH Stroke Scale - NIH Stroke Score                                                               

Date: 2020                                                                                  

Time: 20:42                                                                                       

Total Score = 0                                                                                   

  1a. Level of Consciousness (LOC) - 0(Alert)                                                     

  1b. Level of Consciousness (LOC) (Year \T\ Age) - 0(Both)                                       

  1c. LOC Commands (Open \T\ Closes Eyes/) - 0(Both)                                          

   2. Best Gaze (Lateral Gaze Paresis) - 0(Normal)                                                

   3. Visual Field Loss - 0(No visual loss)                                                       

   4. Facial Palsy - 0(Normal)                                                                    

  5a. Left Arm: Motor (10-second hold) - 0(No drift)                                              

  5b. Right Arm: Motor (10-second hold) - 0(No drift)                                             

  6a. Left Leg: Motor (5-second hold - always test supine) - 0(No drift)                          

  6b. Right Leg: Motor (5-second hold - always test supine) - 0(No drift)                         

   7. Limb Ataxia (finger/nose \T\ heel/shin - test with eyes open) - 0(Absent)                   

   8. Sensory Loss (pinprick arms/legs/face) - 0(Normal)                                          

   9. Best Language: Aphasia (description/naming/reading) - 0(No aphasia)                         

  10. Dysarthria (speech clarity - read or repeat words) - 0(Normal)                              

  11. Extinction and Inattention (visual/tactile/auditory/spatial/personal) - 0(No                

      abnormality)                                                                                

Initials: rn                                                                                      

                                                                                                  

Addendum:                                                                                         

2020                                                                                        

     09:56 Addendum: Culture Results: Positive urine culture. Phone call Attempt #1 pt was iw     

           transferred to [facility] , [facility] facility, pt has been discharged home.          

                                                                                                  

Signatures:                                                                                       

Dispatcher [employer]                           Delfina Saravia, Matt Vazquez RN, MD MD rn Espinosa, Pam Cheney RN                      Shine Martin ea, RN RN jd3 Willis, Michelle                             mw3                                                  

Eva Raygoza                                 ag3                                                  

Susannah Powers, RN                         Hebert Sanchez, RN                       DALIA   ll1                                                  

                                                                                                  

**************************************************************************************************

## 2020-03-30 VITALS — OXYGEN SATURATION: 99 % | DIASTOLIC BLOOD PRESSURE: 67 MMHG | SYSTOLIC BLOOD PRESSURE: 116 MMHG

## 2020-08-09 NOTE — XMS REPORT
Patient Summary Document

 Created on:2020



Patient:CLOSS, MARY SUZANNE

Sex:Female

:1955

External Reference #:[id number]





Demographics







 Address  [address] [place] [address]

 

 Home Phone  [phone]

 

 Work Phone  [phone]

 

 Email Address  NONE

 

 Preferred Language  Unknown

 

 Marital Status  Unknown

 

 [employer] Affiliation  Unknown

 

 Race  Unknown

 

 Additional Race(s)  Unavailable

 

 Ethnic Group  Unknown









Author







 Organization  [facility]

 

 Address  [address]



   [place] [address]

 

 Phone  [phone]









Care Team Providers







 Name  Role  Phone

 

 LAYO PATEL  Unavailable  Unavailable

 

 DALE DAVENPORT  Unavailable  Unavailable

 

 TUNG, LORA SHAUCHISIOMARA  Unavailable  Unavailable

 

 DOMONIQUE, VON  Unavailable  Unavailable

 

 MATT JENKINS  Unavailable  Unavailable









Problems

This patient has no known problems.



Allergies, Adverse Reactions, Alerts

This patient has no known allergies or adverse reactions.



Medications

This patient has no known medications.



Results







 Test Description  Test Time  Test Comments  Text Results  Atomic Results  
Result Comments









 MR, BRAIN, WITH  2020 12:50:00    FINAL REPORT PATIENT ID:   [id number] 
MRI Brain  



       with and without contrast Clinical History:  



       Neoplasm: head, metastatic Technique: MRI of  



       the brain utilizing axial T1, T2, FLAIR, GRE,  



       DWI, sagittal T1; and postgadolinium axial,  



       sagittal, and coronal T1-weighted images.  



       Comparisons: MRI brain 2020, 2020,  



       10/7/2019 Findings: A left frontal craniotomy  



       is again seen with an underlying cystic  



       resection cavity which demonstrates  



       increased, slightly thick, marginal  



       enhancement. Surrounding nonenhancing FLAIR  



       signal abnormality in the left frontal lobe  



       has slightly decreased, as has mass effect on  



       the left frontal horn and rightward midline  



       shift of the anterior falx. Mild hemosiderin  



       is again seen in the surgical cavity. There  



       is no additional masslike enhancement  



       elsewhere in the brain.  A small left  



       anterior frontal extra-axial fluid collection  



       is similar appearing. There is no evidence  



       for acute infarct or hemorrhage. There is no  



       hydrocephalus. There is no other significant  



       white matter disease. The major intracranial  



       flow-voids appear patent. IMPRESSION: Since  



       2020, there is increased thickened  



       enhancement along the margins of the left  



       frontal resection cavity, for which continued  



       attention on follow-up is recommended to  



       evaluate for recurrent neoplasm. Signed:  



       Chelsea Crooks MDReport Verified Date/Time:  



       2020 12:50:03 Reading Location: [facility]  



       C013V Neuro Reading Room      Electronically  



       signed by: CHELSEA CROOKS M.D. on 2020  



       12:50 PM  

 

 TISSUE EXAM  2020 15:43:00    Surgical Pathology Report  



           Case: [id number]  



             Authorizing Provider:  Dennis Alvarez MD  



                 Collected:           2020  



       1215            Ordering Location:     [facility]  



       PERIOPERATIVE         Received:  



       2020 1222  



           SERVICES  



                                        Pathologist:  



                 Kalpesh Rod MD  



         



       Specimens:   A) - Soft Tissue, Other, left  



       frontal lesion (frozen and permanent)  



                             B) - Soft Tissue,  



       Other, left frontal lesion  



                            A. BRAIN, LEFT FRONTAL,  



       CRANIOTOMY:BLAND CEREBRAL PARENCHYMAL  



       NECROSIS WITH SURROUNDING WHITE MATTER  



       GLIOSIS, CONSISTENT WITH THERAPY CHANGESB.  



       BRAIN, LEFT FRONTAL, CRANIOTOMY:BLAND  



       CEREBRAL PARENCHYMAL NECROSIS WITH  



       SURROUNDING WHITE MATTER GLIOSIS, CONSISTENT  



       WITH THERAPY CHANGESNO TUMOR PRESENT  



       Signing Pathologist Direct Phone Line:  



       [phone] signed by Kalpesh Rod MD on 2020 at  3:43 PMEach of  



       the two specimens were examined in their  



       entirety. Large regions of necrosis, some  



       vascular fibrinoid necrosis, and reactive  



       gliosis are noted. No tumor is present in  



       either of the two specimens. Immunoperoxidase  



       stains for epithelial markers (AE1/3 and Cam  



       5.2), as well as PAX-8 are negative and do  



       not stain in regions of necrosis. 02217 z 2;  



       80405; 88341 x 2; 51304Topyfgk of prior brain  



       metastasesB. Left frontal lesionA. Received  



       fresh labeled "left frontal lesion (frozen  



       and permanent)" is a tan-pink and granular  



       soft tissue measuring in aggregate 3 x 2.5 x  



       0.5 cm. Touch preparations and frozen  



       sections are performed. The specimen is  



       submitted entirely in cassettes FSA1-A6.  



       CYB/ewB. Received fresh labeled with the  



       patient's name, accession number and "left  



       frontal lesion" is a 2.5 x 2 x 0.4 cm  



       aggregate of multiple, irregular yellow-gray,  



       partially necrotic soft tissue fragments. The  



       largest tissue is sectioned and the specimen  



       is entirely submitted in B1.  CG/pl LEFT  



       FRONTAL LESION, BIOPSY:  - BRAIN WITH  



       REACTIVE GLIOSIS AND NECROSIS WITH DYSTROPHIC  



       CALCIFICATIONS The results were verbally  



       reported to Dr. Alvarez by Dr. Rod at 12:48  



       p.m. PerformedThe interpretation of this case  



       included the use of immunohistochemistry or  



       special stains.Control Slides Examined:  



       In-house known positive controls were  



       evaluated along with the test tissue.  These  



       control slides run alongside of the patients  



       sample show appropriate staining. Internal  



       positive and negative controls when available  



       are evaluated Immunohistochemistry technical  



       testing was performed at [facility] where it  



       was developed and its performance  



       characteristics were determined. It has not  



       been cleared or approved by the U.S. Food and  



       Drug Administration. The FDA has determined  



       that such clearance or approval is not  



       necessary. The test is used for clinical  



       purposes. It should not be regarded as  



       investigational or for research. This  



       laboratory is certified under the Clinical  



       Laboratory Improvement Amendments of 1988  



       (CLIA-88) as qualified to perform high  



       complexity clinical laboratory testing.  









 POCT-GLUCOSE METER  2020 09:21:00    









   Test Item  Value  Reference Range  Comments









 POC-GLUCOSE METER (BEAKER)  158 mg/dL    : TESTED AT [address]



 (test zdah=0688)      [place], [address]: /Technician



       ZH=[id number] for BOAZ MOTLEY



BASIC METABOLIC [id number] 06:25:00





 Test Item  Value  Reference Range  Comments

 

 SODIUM (BEAKER) (test  138 meq/L  136-145  



 iinp=911)      

 

 POTASSIUM (BEAKER) (test  3.8 meq/L  3.5-5.1  



 code=379)      

 

 CHLORIDE (BEAKER) (test  106 meq/L    



 code=382)      

 

 CO2 (BEAKER) (test  26 meq/L  22-29  



 code=355)      

 

 BLOOD UREA NITROGEN  21 mg/dL  7-21  



 (BEAKER) (test code=354)      

 

 CREATININE (BEAKER) (test  0.70 mg/dL  0.57-1.25  



 code=358)      

 

 GLUCOSE RANDOM (BEAKER)  121 mg/dL    



 (test code=652)      

 

 CALCIUM (BEAKER) (test  8.6 mg/dL  8.4-10.2  



 code=697)      

 

 EGFR (BEAKER) (test  84 mL/min/1.73 sq m    ESTIMATED GFR IS NOT AS



 code=1092)      ACCURATE AS CREATININE



       CLEARANCE IN PREDICTING



       GLOMERULAR FILTRATION



       RATE. ESTIMATED GFR IS



       NOT APPLICABLE FOR



       DIALYSIS PATIENTS.



 ID - TREVA MCBC W/PLT COUNT &amp; AUTO [id number] 06:08:00





 Test Item  Value  Reference Range  Comments

 

 WHITE BLOOD CELL COUNT (BEAKER) (test code=775)  7.9 K/ L  3.5-10.5  

 

 RED BLOOD CELL COUNT (BEAKER) (test code=761)  3.94 M/ L  3.93-5.22  

 

 HEMOGLOBIN (BEAKER) (test code=410)  12.1 GM/DL  11.2-15.7  

 

 HEMATOCRIT (BEAKER) (test code=411)  35.1 %  34.1-44.9  

 

 MEAN CORPUSCULAR VOLUME (BEAKER) (test code=753)  89.1 fL  79.4-94.8  

 

 MEAN CORPUSCULAR HEMOGLOBIN (BEAKER) (test  30.7 pg  25.6-32.2  



 ldkh=436)      

 

 MEAN CORPUSCULAR HEMOGLOBIN CONC (BEAKER) (test  34.5 GM/DL  32.2-35.5  



 code=752)      

 

 RED CELL DISTRIBUTION WIDTH (BEAKER) (test  12.6 %  11.7-14.4  



 code=412)      

 

 PLATELET COUNT (BEAKER) (test code=756)  147 K/CU MM  150-450  

 

 MEAN PLATELET VOLUME (BEAKER) (test code=754)  10.5 fL  9.4-12.3  

 

 NUCLEATED RED BLOOD CELLS (BEAKER) (test  0 /100 WBC  0-0  



 code=413)      

 

 NEUTROPHILS RELATIVE PERCENT (BEAKER) (test  81 %    



 code=429)      

 

 LYMPHOCYTES RELATIVE PERCENT (BEAKER) (test  11 %    



 code=430)      

 

 MONOCYTES RELATIVE PERCENT (BEAKER) (test  8 %    



 code=431)      

 

 EOSINOPHILS RELATIVE PERCENT (BEAKER) (test  0 %    



 code=432)      

 

 BASOPHILS RELATIVE PERCENT (BEAKER) (test  0 %    



 code=437)      

 

 NEUTROPHILS ABSOLUTE COUNT (BEAKER) (test  6.42 K/ L  1.56-6.13  



 code=670)      

 

 LYMPHOCYTES ABSOLUTE COUNT (BEAKER) (test  0.85 K/ L  1.18-3.74  



 code=414)      

 

 MONOCYTES ABSOLUTE COUNT (BEAKER) (test  0.60 K/ L  0.24-0.36  



 code=415)      

 

 EOSINOPHILS ABSOLUTE COUNT (BEAKER) (test  0.00 K/ L  0.04-0.36  



 code=416)      

 

 BASOPHILS ABSOLUTE COUNT (BEAKER) (test  0.01 K/ L  0.01-0.08  



 code=417)      

 

 IMMATURE GRANULOCYTES-RELATIVE PERCENT (BEAKER)  1 %  0-1  



 (test code=2801)      



POCT-GLUCOSE [id number] 21:10:00





 Test Item  Value  Reference Range  Comments

 

 POC-GLUCOSE METER (BEAKER)  147 mg/dL    : TESTED AT [facility] [address]



 (test rewq=4739)      [facility], [address]:



       /Technician SF=[id number]



       for RADHA CAM



POCT-GLUCOSE [id number] 18:53:00





 Test Item  Value  Reference Range  Comments

 

 POC-GLUCOSE METER (BEAKER)  110 mg/dL    : TESTED AT [address]



 (test rkck=6441)      [facility], [address]:



       /Technician HR=[id number]



       for Lily Sen



MR, BRAIN, [id number] 16:21:00FINAL REPORT PATIENT ID:   [id number] MR, BRAIN
, WITH \T\ WITHOUT CONTRAST INDICATION: s/p resection of brain mass TECHNIQUE: 
Multiplanar, multisequence MR imaging of the brain prior to and following 
intravenous administration of contrast. COMPARISON: MRI 2020 FINDINGS:  
Intracranial: Interval leftfrontal craniotomy for resection of the large 
enhancing lesion along the prior posterior resection margin. A thin rim of 
enhancement along the new resection cavity is expected reactive to surgery. 
Trace extra-axial collection underlying the craniotomy, fluid and blood 
products within the resection cavity, small foci of pneumocephalus, and small 
rim of cytotoxic edema, are all expected postoperative findings. Similar degree 
of surrounding FLAIR hyperintensity. Improved mass effect with minimal residual 
rightward midline shift. Sinuses: No evidence of sinusitis. Mastoids are clear. 
Orbits: Globes areintact. Calvarium \T\ scalp: Unremarkable. IMPRESSION:
Interval repeat left frontal craniotomy for resection of large enhancing mass 
along the prior posterior resection margin. Improved mass effect. Expected 
postoperative findings without evidence of complication. Signed: Alyson Castle Verified Date/Time:  2020 16:21:16   Electronically signed by: 
ALYSON CASTLE MD on 2020 04:21 PMPOCT-GLUCOSE [id number] 
11:57:00





 Test Item  Value  Reference Range  Comments

 

 POC-GLUCOSE METER (BEAKER)  119 mg/dL    : Notified RN/MD: TESTED AT



 (test code=1538)      [address],



       [address]: /Technician



       QM=[id number] for SHEEN, KAIDER



POCT-GLUCOSE [id number] 08:46:00





 Test Item  Value  Reference Range  Comments

 

 POC-GLUCOSE METER (BEAKER)  115 mg/dL    : Notified RN/MD: TESTED AT



 (test code=1538)      [facility] [address] [facility],



       [address]: /Technician



       WS=[id number] for MARIA FERNANDA BAUTISTA



[id number] 05:01:00





 Test Item  Value  Reference Range  Comments

 

 PHOSPHORUS (BEAKER) (test code=604)  3.8 mg/dL  2.3-4.7  



 ID Rena TILLEY [id number] 05:01:00





 Test Item  Value  Reference Range  Comments

 

 MAGNESIUM (BEAKER) (test code=627)  2.2 mg/dL  1.6-2.6  



 ID - TREVA MBASIC METABOLIC [id number] 05:01:00





 Test Item  Value  Reference Range  Comments

 

 SODIUM (BEAKER) (test  141 meq/L  136-145  



 iejd=873)      

 

 POTASSIUM (BEAKER) (test  3.6 meq/L  3.5-5.1  



 code=379)      

 

 CHLORIDE (BEAKER) (test  109 meq/L    



 code=382)      

 

 CO2 (BEAKER) (test  25 meq/L  22-29  



 code=355)      

 

 BLOOD UREA NITROGEN  16 mg/dL  7-21  



 (BEAKER) (test code=354)      

 

 CREATININE (BEAKER) (test  0.70 mg/dL  0.57-1.25  



 code=358)      

 

 GLUCOSE RANDOM (BEAKER)  111 mg/dL    



 (test code=652)      

 

 CALCIUM (BEAKER) (test  8.6 mg/dL  8.4-10.2  



 code=697)      

 

 EGFR (BEAKER) (test  84 mL/min/1.73 sq m    ESTIMATED GFR IS NOT AS



 code=1092)      ACCURATE AS CREATININE



       CLEARANCE IN PREDICTING



       GLOMERULAR FILTRATION



       RATE. ESTIMATED GFR IS



       NOT APPLICABLE FOR



       DIALYSIS PATIENTS.



 ID - TREVA MCBC W/PLT COUNT &amp; AUTO [id number] 05:01:00





 Test Item  Value  Reference Range  Comments

 

 WHITE BLOOD CELL COUNT (BEAKER) (test code=775)  10.4 K/ L  3.5-10.5  

 

 RED BLOOD CELL COUNT (BEAKER) (test code=761)  4.05 M/ L  3.93-5.22  

 

 HEMOGLOBIN (BEAKER) (test code=410)  12.2 GM/DL  11.2-15.7  

 

 HEMATOCRIT (BEAKER) (test code=411)  36.2 %  34.1-44.9  

 

 MEAN CORPUSCULAR VOLUME (BEAKER) (test code=753)  89.4 fL  79.4-94.8  

 

 MEAN CORPUSCULAR HEMOGLOBIN (BEAKER) (test  30.1 pg  25.6-32.2  



 dqlv=623)      

 

 MEAN CORPUSCULAR HEMOGLOBIN CONC (BEAKER) (test  33.7 GM/DL  32.2-35.5  



 code=752)      

 

 RED CELL DISTRIBUTION WIDTH (BEAKER) (test  12.7 %  11.7-14.4  



 code=412)      

 

 PLATELET COUNT (BEAKER) (test code=756)  150 K/CU MM  150-450  

 

 MEAN PLATELET VOLUME (BEAKER) (test code=754)  10.6 fL  9.4-12.3  

 

 NUCLEATED RED BLOOD CELLS (BEAKER) (test  0 /100 WBC  0-0  



 code=413)      

 

 NEUTROPHILS RELATIVE PERCENT (BEAKER) (test  81 %    



 code=429)      

 

 LYMPHOCYTES RELATIVE PERCENT (BEAKER) (test  9 %    



 code=430)      

 

 MONOCYTES RELATIVE PERCENT (BEAKER) (test  9 %    



 code=431)      

 

 EOSINOPHILS RELATIVE PERCENT (BEAKER) (test  0 %    



 code=432)      

 

 BASOPHILS RELATIVE PERCENT (BEAKER) (test  0 %    



 code=437)      

 

 NEUTROPHILS ABSOLUTE COUNT (BEAKER) (test  8.46 K/ L  1.56-6.13  



 code=670)      

 

 LYMPHOCYTES ABSOLUTE COUNT (BEAKER) (test  0.97 K/ L  1.18-3.74  



 code=414)      

 

 MONOCYTES ABSOLUTE COUNT (BEAKER) (test  0.91 K/ L  0.24-0.36  



 code=415)      

 

 EOSINOPHILS ABSOLUTE COUNT (BEAKER) (test  0.00 K/ L  0.04-0.36  



 code=416)      

 

 BASOPHILS ABSOLUTE COUNT (BEAKER) (test  0.01 K/ L  0.01-0.08  



 code=417)      

 

 IMMATURE GRANULOCYTES-RELATIVE PERCENT (BEAKER)  1 %  0-1  



 (test code=2801)      



POCT-GLUCOSE [id number] 22:00:00





 Test Item  Value  Reference Range  Comments

 

 POC-GLUCOSE METER (BEAKER)  149 mg/dL    : TESTED AT [facility] [address]



 (test aidr=6244)      [facility], [address]:



       /Technician IN=[id number]



       for LAINE BIRMINGHAM



POCT-GLUCOSE [id number] 18:49:00





 Test Item  Value  Reference Range  Comments

 

 POC-GLUCOSE METER (BEAKER)  115 mg/dL    : TESTED AT [facility] [address]



 (test gnwc=3898)      [facility], [address]:



       /Technician WJ=[id number]



       for Kristy Valencia



BASIC METABOLIC [id number] 07:14:00





 Test Item  Value  Reference Range  Comments

 

 SODIUM (BEAKER) (test  139 meq/L  136-145  



 pqyf=019)      

 

 POTASSIUM (BEAKER) (test  3.8 meq/L  3.5-5.1  Specimen slightly



 code=379)      hemolyzed

 

 CHLORIDE (BEAKER) (test  106 meq/L    



 code=382)      

 

 CO2 (BEAKER) (test  22 meq/L  22-29  



 code=355)      

 

 BLOOD UREA NITROGEN  20 mg/dL  7-21  



 (BEAKER) (test code=354)      

 

 CREATININE (BEAKER) (test  0.73 mg/dL  0.57-1.25  Specimen slightly



 code=358)      hemolyzed

 

 GLUCOSE RANDOM (BEAKER)  104 mg/dL    



 (test code=652)      

 

 CALCIUM (BEAKER) (test  9.2 mg/dL  8.4-10.2  



 code=697)      

 

 EGFR (BEAKER) (test  80 mL/min/1.73 sq m    ESTIMATED GFR IS NOT AS



 code=1092)      ACCURATE AS CREATININE



       CLEARANCE IN PREDICTING



       GLOMERULAR FILTRATION



       RATE. ESTIMATED GFR IS



       NOT APPLICABLE FOR



       DIALYSIS PATIENTS.



 ID - ANNIE FCBC W/PLT COUNT &amp; AUTO [id number] 06:57:
00





 Test Item  Value  Reference Range  Comments

 

 WHITE BLOOD CELL COUNT (BEAKER) (test code=775)  7.0 K/ L  3.5-10.5  

 

 RED BLOOD CELL COUNT (BEAKER) (test code=761)  4.38 M/ L  3.93-5.22  

 

 HEMOGLOBIN (BEAKER) (test code=410)  13.1 GM/DL  11.2-15.7  

 

 HEMATOCRIT (BEAKER) (test code=411)  39.3 %  34.1-44.9  

 

 MEAN CORPUSCULAR VOLUME (BEAKER) (test code=753)  89.7 fL  79.4-94.8  

 

 MEAN CORPUSCULAR HEMOGLOBIN (BEAKER) (test  29.9 pg  25.6-32.2  



 dtfu=303)      

 

 MEAN CORPUSCULAR HEMOGLOBIN CONC (BEAKER) (test  33.3 GM/DL  32.2-35.5  



 code=752)      

 

 RED CELL DISTRIBUTION WIDTH (BEAKER) (test  12.5 %  11.7-14.4  



 code=412)      

 

 PLATELET COUNT (BEAKER) (test code=756)  158 K/CU MM  150-450  

 

 MEAN PLATELET VOLUME (BEAKER) (test code=754)  10.8 fL  9.4-12.3  

 

 NUCLEATED RED BLOOD CELLS (BEAKER) (test  0 /100 WBC  0-0  



 code=413)      

 

 NEUTROPHILS RELATIVE PERCENT (BEAKER) (test  82 %    



 code=429)      

 

 LYMPHOCYTES RELATIVE PERCENT (BEAKER) (test  13 %    



 code=430)      

 

 MONOCYTES RELATIVE PERCENT (BEAKER) (test  5 %    



 code=431)      

 

 EOSINOPHILS RELATIVE PERCENT (BEAKER) (test  0 %    



 code=432)      

 

 BASOPHILS RELATIVE PERCENT (BEAKER) (test  0 %    



 code=437)      

 

 NEUTROPHILS ABSOLUTE COUNT (BEAKER) (test  5.67 K/ L  1.56-6.13  



 code=670)      

 

 LYMPHOCYTES ABSOLUTE COUNT (BEAKER) (test  0.93 K/ L  1.18-3.74  



 code=414)      

 

 MONOCYTES ABSOLUTE COUNT (BEAKER) (test  0.33 K/ L  0.24-0.36  



 code=415)      

 

 EOSINOPHILS ABSOLUTE COUNT (BEAKER) (test  0.00 K/ L  0.04-0.36  



 code=416)      

 

 BASOPHILS ABSOLUTE COUNT (BEAKER) (test  0.00 K/ L  0.01-0.08  



 code=417)      

 

 IMMATURE GRANULOCYTES-RELATIVE PERCENT (BEAKER)  0 %  0-1  



 (test code=2801)      



POCT-GLUCOSE [id number] 21:31:00





 Test Item  Value  Reference Range  Comments

 

 POC-GLUCOSE METER (BEAKER)  120 mg/dL    : TESTED AT [facility] [address] [place] [address]



 (test ihnn=7458)      [facility], [address]:



       /Technician CB=[id number]



       for RADHA CAM



POCT-GLUCOSE [id number] 13:03:00





 Test Item  Value  Reference Range  Comments

 

 POC-GLUCOSE METER (BEAKER)  144 mg/dL    : TESTED AT [facility] [address]



 (test amus=2055)      [facility], [address]:



       /Technician FP=[id number]



       for BOAZ MOTLEY



POCT-GLUCOSE [id number] 09:06:00





 Test Item  Value  Reference Range  Comments

 

 POC-GLUCOSE METER (BEAKER)  140 mg/dL    : TESTED AT [facility] [address]



 (test ymcs=9571)      [facility], [address]:



       /Technician CW=[id number]



       for BOAZ MOTLEY



BASIC METABOLIC [id number] 08:07:00





 Test Item  Value  Reference Range  Comments

 

 SODIUM (BEAKER) (test  140 meq/L  136-145  



 jzci=020)      

 

 POTASSIUM (BEAKER) (test  3.7 meq/L  3.5-5.1  



 code=379)      

 

 CHLORIDE (BEAKER) (test  108 meq/L    



 code=382)      

 

 CO2 (BEAKER) (test  22 meq/L  22-29  



 code=355)      

 

 BLOOD UREA NITROGEN  16 mg/dL  7-21  



 (BEAKER) (test code=354)      

 

 CREATININE (BEAKER) (test  0.72 mg/dL  0.57-1.25  



 code=358)      

 

 GLUCOSE RANDOM (BEAKER)  111 mg/dL    



 (test code=652)      

 

 CALCIUM (BEAKER) (test  8.7 mg/dL  8.4-10.2  



 code=697)      

 

 EGFR (BEAKER) (test  81 mL/min/1.73 sq m    ESTIMATED GFR IS NOT AS



 code=1092)      ACCURATE AS CREATININE



       CLEARANCE IN PREDICTING



       GLOMERULAR FILTRATION



       RATE. ESTIMATED GFR IS



       NOT APPLICABLE FOR



       DIALYSIS PATIENTS.



 ID - ROSIANGCBC W/PLT COUNT &amp; AUTO [id number] 06:01:00





 Test Item  Value  Reference Range  Comments

 

 WHITE BLOOD CELL COUNT (BEAKER) (test code=775)  6.9 K/ L  3.5-10.5  

 

 RED BLOOD CELL COUNT (BEAKER) (test code=761)  4.24 M/ L  3.93-5.22  

 

 HEMOGLOBIN (BEAKER) (test code=410)  12.7 GM/DL  11.2-15.7  

 

 HEMATOCRIT (BEAKER) (test code=411)  38.0 %  34.1-44.9  

 

 MEAN CORPUSCULAR VOLUME (BEAKER) (test code=753)  89.6 fL  79.4-94.8  

 

 MEAN CORPUSCULAR HEMOGLOBIN (BEAKER) (test  30.0 pg  25.6-32.2  



 dfea=175)      

 

 MEAN CORPUSCULAR HEMOGLOBIN CONC (BEAKER) (test  33.4 GM/DL  32.2-35.5  



 code=752)      

 

 RED CELL DISTRIBUTION WIDTH (BEAKER) (test  12.5 %  11.7-14.4  



 code=412)      

 

 PLATELET COUNT (BEAKER) (test code=756)  153 K/CU MM  150-450  

 

 MEAN PLATELET VOLUME (BEAKER) (test code=754)  10.5 fL  9.4-12.3  

 

 NUCLEATED RED BLOOD CELLS (BEAKER) (test  0 /100 WBC  0-0  



 code=413)      

 

 NEUTROPHILS RELATIVE PERCENT (BEAKER) (test  84 %    



 code=429)      

 

 LYMPHOCYTES RELATIVE PERCENT (BEAKER) (test  11 %    



 code=430)      

 

 MONOCYTES RELATIVE PERCENT (BEAKER) (test  4 %    



 code=431)      

 

 EOSINOPHILS RELATIVE PERCENT (BEAKER) (test  0 %    



 code=432)      

 

 BASOPHILS RELATIVE PERCENT (BEAKER) (test  0 %    



 code=437)      

 

 NEUTROPHILS ABSOLUTE COUNT (BEAKER) (test  5.78 K/ L  1.56-6.13  



 code=670)      

 

 LYMPHOCYTES ABSOLUTE COUNT (BEAKER) (test  0.76 K/ L  1.18-3.74  



 code=414)      

 

 MONOCYTES ABSOLUTE COUNT (BEAKER) (test  0.29 K/ L  0.24-0.36  



 code=415)      

 

 EOSINOPHILS ABSOLUTE COUNT (BEAKER) (test  0.00 K/ L  0.04-0.36  



 code=416)      

 

 BASOPHILS ABSOLUTE COUNT (BEAKER) (test  0.01 K/ L  0.01-0.08  



 code=417)      

 

 IMMATURE GRANULOCYTES-RELATIVE PERCENT (BEAKER)  1 %  0-1  



 (test code=2801)      



POCT-GLUCOSE [id number] 21:34:00





 Test Item  Value  Reference Range  Comments

 

 POC-GLUCOSE METER (BEAKER)  161 mg/dL    : TESTED AT [facility] [address]



 (test ikiz=8213)      [facility], [id number]:



       /Technician NO=[id number]



       for RADHA CAM



POCT-GLUCOSE [id number] 17:29:00





 Test Item  Value  Reference Range  Comments

 

 POC-GLUCOSE METER (BEAKER)  134 mg/dL    : TESTED AT [address]



 (test muqd=0430)      [facility], [id number]:



       /Technician YO=[id number]



       for FELICIANO MOTLEYHELLE



POCT-GLUCOSE [id number] 12:17:00





 Test Item  Value  Reference Range  Comments

 

 POC-GLUCOSE METER (BEAKER)  118 mg/dL    : TESTED AT [address]



 (test vevg=1269)      [facility], [address]:



       /Technician OR=[id number]



       for BROWN, BOAZ



POCT-GLUCOSE [id number] 09:23:00





 Test Item  Value  Reference Range  Comments

 

 POC-GLUCOSE METER (BEAKER)  144 mg/dL    : TESTED AT [address]



 (test lcyf=0271)      [facility], [address]:



       /Technician ET=[id number]



       for BROWN, BOAZ



[id number] 07:09:00





 Test Item  Value  Reference Range  Comments

 

 PROTIME (BEAKER) (test code=759)  12.8 seconds  11.9-14.2  

 

 INR (BEAKER) (test code=370)  1.0  <=5.9  

 

 PARTIAL THROMBOPLASTIN TIME (BEAKER) (test  24.5 seconds  22.5-36.0  



 code=760)      



Effective 2019: PT Reference Range ChangeNew: 11.9-14.2  Previous: 11.7-
14.7RECOMMENDED COUMADIN/WARFARIN INR THERAPY RANGESSTANDARD DOSE: 2.0-3.0  
Includes: PROPHYLAXIS for venous thrombosis, systemic embolization; TREATMENT 
for venous thrombosis and/or pulmonary embolus.HIGH RISK: Target INR is2.5-3.5 
for patients wiht mechanical heart valves.[id number] 06:11:00





 Test Item  Value  Reference Range  Comments

 

 PHOSPHORUS (BEAKER) (test code=604)  3.8 mg/dL  2.3-4.7  



 ID - TREVA [id number] 06:11:00





 Test Item  Value  Reference Range  Comments

 

 MAGNESIUM (BEAKER) (test code=627)  2.0 mg/dL  1.6-2.6  



 ID - TREVA MBASIC METABOLIC [id number] 06:11:00





 Test Item  Value  Reference Range  Comments

 

 SODIUM (BEAKER) (test  141 meq/L  136-145  



 xmma=536)      

 

 POTASSIUM (BEAKER) (test  3.6 meq/L  3.5-5.1  



 code=379)      

 

 CHLORIDE (BEAKER) (test  108 meq/L    



 code=382)      

 

 CO2 (BEAKER) (test  24 meq/L  22-29  



 code=355)      

 

 BLOOD UREA NITROGEN  18 mg/dL  7-21  



 (BEAKER) (test code=354)      

 

 CREATININE (BEAKER) (test  0.75 mg/dL  0.57-1.25  



 code=358)      

 

 GLUCOSE RANDOM (BEAKER)  121 mg/dL    



 (test code=652)      

 

 CALCIUM (BEAKER) (test  8.9 mg/dL  8.4-10.2  



 code=697)      

 

 EGFR (BEAKER) (test  78 mL/min/1.73 sq m    ESTIMATED GFR IS NOT AS



 code=1092)      ACCURATE AS CREATININE



       CLEARANCE IN PREDICTING



       GLOMERULAR FILTRATION



       RATE. ESTIMATED GFR IS



       NOT APPLICABLE FOR



       DIALYSIS PATIENTS.



 ID - TREVA MCBC W/PLT COUNT &amp; AUTO [id number] 06:06:00





 Test Item  Value  Reference Range  Comments

 

 WHITE BLOOD CELL COUNT (BEAKER) (test code=775)  7.4 K/ L  3.5-10.5  

 

 RED BLOOD CELL COUNT (BEAKER) (test code=761)  4.04 M/ L  3.93-5.22  

 

 HEMOGLOBIN (BEAKER) (test code=410)  12.2 GM/DL  11.2-15.7  

 

 HEMATOCRIT (BEAKER) (test code=411)  36.4 %  34.1-44.9  

 

 MEAN CORPUSCULAR VOLUME (BEAKER) (test code=753)  90.1 fL  79.4-94.8  

 

 MEAN CORPUSCULAR HEMOGLOBIN (BEAKER) (test  30.2 pg  25.6-32.2  



 urum=681)      

 

 MEAN CORPUSCULAR HEMOGLOBIN CONC (BEAKER) (test  33.5 GM/DL  32.2-35.5  



 code=752)      

 

 RED CELL DISTRIBUTION WIDTH (BEAKER) (test  12.3 %  11.7-14.4  



 code=412)      

 

 PLATELET COUNT (BEAKER) (test code=756)  130 K/CU MM  150-450  

 

 MEAN PLATELET VOLUME (BEAKER) (test code=754)  11.3 fL  9.4-12.3  

 

 NUCLEATED RED BLOOD CELLS (BEAKER) (test  0 /100 WBC  0-0  



 code=413)      

 

 NEUTROPHILS RELATIVE PERCENT (BEAKER) (test  86 %    



 code=429)      

 

 LYMPHOCYTES RELATIVE PERCENT (BEAKER) (test  10 %    



 code=430)      

 

 MONOCYTES RELATIVE PERCENT (BEAKER) (test  3 %    



 code=431)      

 

 EOSINOPHILS RELATIVE PERCENT (BEAKER) (test  0 %    



 code=432)      

 

 BASOPHILS RELATIVE PERCENT (BEAKER) (test  0 %    



 code=437)      

 

 NEUTROPHILS ABSOLUTE COUNT (BEAKER) (test  6.35 K/ L  1.56-6.13  



 code=670)      

 

 LYMPHOCYTES ABSOLUTE COUNT (BEAKER) (test  0.75 K/ L  1.18-3.74  



 code=414)      

 

 MONOCYTES ABSOLUTE COUNT (BEAKER) (test  0.22 K/ L  0.24-0.36  



 code=415)      

 

 EOSINOPHILS ABSOLUTE COUNT (BEAKER) (test  0.00 K/ L  0.04-0.36  



 code=416)      

 

 BASOPHILS ABSOLUTE COUNT (BEAKER) (test  0.01 K/ L  0.01-0.08  



 code=417)      

 

 IMMATURE GRANULOCYTES-RELATIVE PERCENT (BEAKER)  1 %  0-1  



 (test code=2801)      



POCT-GLUCOSE [id number] 21:23:00





 Test Item  Value  Reference Range  Comments

 

 POC-GLUCOSE METER (BEAKER)  119 mg/dL    : TESTED AT [address]



 (test pqkn=6199)      [facility], [address]:



       /Technician [id number]



       for RADHA CAM



POCT-GLUCOSE [id number] 17:11:00





 Test Item  Value  Reference Range  Comments

 

 POC-GLUCOSE METER (BEAKER)  111 mg/dL    : TESTED AT [address]



 (test yvtw=6390)      [facility], [address]:



       /Technician [id number]



       for LEYLA AUGUSTINE



POCT-GLUCOSE [id number] 11:43:00





 Test Item  Value  Reference Range  Comments

 

 POC-GLUCOSE METER (BEAKER)  108 mg/dL    : TESTED AT [address]



 (test adzc=5547)      [facility], [address]:



       /Technician [id number]



       for AUGUSTINE MAYER



RAD, CHEST, 1 VIEW, NON [id number] 11:28:00Reason for exam:-&gt;PreopShould 
this be performed at the bedside?-&gt;YesFINAL REPORT PATIENT ID:   [id number] 
INDICATION: Preop COMPARISON: None TECHNIQUE: Single frontal view of the chest. 
FINDINGS: Lungs and pleura: Clear lungs. No effusion.Heart and mediastinum: 
Normal heart size. Unremarkable mediastinal contours.Osseous structures: No 
acute abnormality.Other: Port-A-Cath tip overlies the SVC.  IMPRESSION: No 
acute intrathoracic abnormality. Signed: Michelle Luque Verified Date/
Time:  2020 11:28:40 Reading Location: [facility] Radiology Reading 
Room Electronically signed by: MICHELLE LUQUE MD on 2020 11:28 
AMPOCT-GLUCOSE [id number] 08:44:00





 Test Item  Value  Reference Range  Comments

 

 POC-GLUCOSE METER (BEAKER)  184 mg/dL    : TESTED AT [facility] [address]



 (test rjhn=2934)      [facility], [address]:



       /Technician [id number]



       for Brittny Carreno



[id number] 06:44:00





 Test Item  Value  Reference Range  Comments

 

 PHOSPHORUS (BEAKER) (test code=604)  4.0 mg/dL  2.3-4.7  



 ID - TREVA [id number] 06:44:00





 Test Item  Value  Reference Range  Comments

 

 MAGNESIUM (BEAKER) (test code=627)  2.1 mg/dL  1.6-2.6  



 ID - TREVA MBASIC METABOLIC [id number] 06:44:00





 Test Item  Value  Reference Range  Comments

 

 SODIUM (BEAKER) (test  142 meq/L  136-145  



 vblr=534)      

 

 POTASSIUM (BEAKER) (test  3.8 meq/L  3.5-5.1  



 code=379)      

 

 CHLORIDE (BEAKER) (test  108 meq/L    



 code=382)      

 

 CO2 (BEAKER) (test  27 meq/L  22-29  



 code=355)      

 

 BLOOD UREA NITROGEN  19 mg/dL  7-21  



 (BEAKER) (test code=354)      

 

 CREATININE (BEAKER) (test  0.81 mg/dL  0.57-1.25  



 code=358)      

 

 GLUCOSE RANDOM (BEAKER)  111 mg/dL    



 (test code=652)      

 

 CALCIUM (BEAKER) (test  9.3 mg/dL  8.4-10.2  



 code=697)      

 

 EGFR (BEAKER) (test  71 mL/min/1.73 sq m    ESTIMATED GFR IS NOT AS



 code=1092)      ACCURATE AS CREATININE



       CLEARANCE IN PREDICTING



       GLOMERULAR FILTRATION



       RATE. ESTIMATED GFR IS



       NOT APPLICABLE FOR



       DIALYSIS PATIENTS.



 ID - TREVA MCBC W/PLT COUNT &amp; AUTO [id number] 06:35:00





 Test Item  Value  Reference Range  Comments

 

 WHITE BLOOD CELL COUNT (BEAKER) (test code=775)  9.9 K/ L  3.5-10.5  

 

 RED BLOOD CELL COUNT (BEAKER) (test code=761)  4.23 M/ L  3.93-5.22  

 

 HEMOGLOBIN (BEAKER) (test code=410)  12.9 GM/DL  11.2-15.7  

 

 HEMATOCRIT (BEAKER) (test code=411)  37.5 %  34.1-44.9  

 

 MEAN CORPUSCULAR VOLUME (BEAKER) (test code=753)  88.7 fL  79.4-94.8  

 

 MEAN CORPUSCULAR HEMOGLOBIN (BEAKER) (test  30.5 pg  25.6-32.2  



 cnzo=047)      

 

 MEAN CORPUSCULAR HEMOGLOBIN CONC (BEAKER) (test  34.4 GM/DL  32.2-35.5  



 code=752)      

 

 RED CELL DISTRIBUTION WIDTH (BEAKER) (test  12.3 %  11.7-14.4  



 code=412)      

 

 PLATELET COUNT (BEAKER) (test code=756)  139 K/CU MM  150-450  

 

 MEAN PLATELET VOLUME (BEAKER) (test code=754)  11.7 fL  9.4-12.3  

 

 NUCLEATED RED BLOOD CELLS (BEAKER) (test  0 /100 WBC  0-0  



 code=413)      

 

 NEUTROPHILS RELATIVE PERCENT (BEAKER) (test  84 %    



 code=429)      

 

 LYMPHOCYTES RELATIVE PERCENT (BEAKER) (test  11 %    



 code=430)      

 

 MONOCYTES RELATIVE PERCENT (BEAKER) (test  4 %    



 code=431)      

 

 EOSINOPHILS RELATIVE PERCENT (BEAKER) (test  0 %    



 code=432)      

 

 BASOPHILS RELATIVE PERCENT (BEAKER) (test  0 %    



 code=437)      

 

 NEUTROPHILS ABSOLUTE COUNT (BEAKER) (test  8.27 K/ L  1.56-6.13  



 code=670)      

 

 LYMPHOCYTES ABSOLUTE COUNT (BEAKER) (test  1.10 K/ L  1.18-3.74  



 code=414)      

 

 MONOCYTES ABSOLUTE COUNT (BEAKER) (test  0.41 K/ L  0.24-0.36  



 code=415)      

 

 EOSINOPHILS ABSOLUTE COUNT (BEAKER) (test  0.00 K/ L  0.04-0.36  



 code=416)      

 

 BASOPHILS ABSOLUTE COUNT (BEAKER) (test  0.01 K/ L  0.01-0.08  



 code=417)      

 

 IMMATURE GRANULOCYTES-RELATIVE PERCENT (BEAKER)  1 %  0-1  



 (test code=2801)      



POCT-GLUCOSE [id number] 21:03:00





 Test Item  Value  Reference Range  Comments

 

 POC-GLUCOSE METER (BEAKER)  142 mg/dL    : Notified RN/MD: TESTED AT



 (test code=1538)      [address],



       [address]: /Technician



       [id number]=[id number] for JACQUELINE ASCENCIO



POCT-GLUCOSE [id number] 16:34:00





 Test Item  Value  Reference Range  Comments

 

 POC-GLUCOSE METER (BEAKER)  83 mg/dL    : TESTED AT [address]



 (test dypl=9142)      [facility], [address]:



       /Technician [id number]



       for HANS TILLMAN



CT, CHEST, WITH [id number] 14:21:00FINAL REPORT PATIENT ID:   [id number]  
CT of the chest, abdomen and pelvis, with contrast Clinical History:  History 
of uterine cancer, now with recurrence of brain met Technique: CT of the chest, 
abdomen and pelvis is performed with intravenous contrast administration.  This 
exam was performed according to our departmental dose optimization program 
which includes automated exposure control, adjustmentof the mA and/or kV 
according to patient's size and/or use of iterative reconstructive technique. 
Comparison Film:  Chest CT dated 2019, abdomen and pelvic CT dated 
2019, chest abdomen and pelvic CT dated 2018 Discussion
: Visualized thyroid gland is normal. There nannette right-sided Port-A-Cath. No 
significant reticular, axillary, mediastinal or hilar lymphadenopathy.Heart and 
pericardium are unremarkable. The esophagus is distended and fluid-filled. 
There is no mass or consolidation. No new pulmonary nodule is identified. No 
pleural effusion. Central airways are patent. No liver lesion is identified. No 
biliary ductal dilatation, gallbladder is unremarkable. Spleen, pancreas, and 
adrenal glands are unremarkable. At the left upper pole kidney, there is a 
stable 2.1 cm cyst. No mass or hydronephrosis, no radiopaque stone. Status post 
gastric sleeve surgery. No evidence of bowel obstruction or abnormal bowel wall 
thickening. Normal appendix. In the pelvis, bladder is normal. Patient is 
status post hysterectomy and bilateral salpingo-oophorectomy. No recurrent mass 
is identified. No pelvic or retroperitoneal adenopathy. A previously seen 
mildly enlarged right pelvic sidewall lymph node only measures 5 mm. Osseous 
structures demonstrate degenerative changes. No suspicious bony lesion is 
identified. Impression: Status post hysterectomy and bilateral salpingo-
oophorectomy. No recurrent or metastatic disease identified in the chest, 
abdomen or pelvis. Status postgastric sleeve surgery. Distended esophagus 
containing fluid, suggestive of gastroesophageal reflux.Signed: Clive Mesaeport Verified Date/Time:  2020 14:21:52 Reading Location: [address] Ortho Consult Reading Room        Electronically signed by: CLIVE MESA M.D. on 2020 02:21 PMCT, [id number] 14:21:00FINAL REPORT PATIENT ID
:   [id number]  CT of the chest, abdomen and pelvis, with contrast Clinical 
History:  History of uterine cancer, now with recurrence of brain met Technique
: CT of the chest, abdomen and pelvis is performed with intravenous contrast 
administration.  This exam was performed according to our departmental dose 
optimization program which includes automated exposure control, adjustmentof 
the mA and/or kV according to patient's size and/or use of iterative 
reconstructive technique. Comparison Film:  Chest CT dated 2019, 
abdomen and pelvic CT dated 2019, chest abdomen and pelvic CT dated 
2018 Discussion: Visualized thyroid gland is normal. There nannette right
-sided Port-A-Cath. No significant reticular, axillary, mediastinal or hilar 
lymphadenopathy.Heart and pericardium are unremarkable. The esophagus is 
distended and fluid-filled. There is no mass or consolidation. No new pulmonary 
nodule is identified. No pleural effusion. Central airways are patent. No liver 
lesion is identified. No biliary ductal dilatation, gallbladder is 
unremarkable. Spleen, pancreas, and adrenal glands are unremarkable. At the 
left upper pole kidney, there is a stable 2.1 cm cyst. No mass or hydronephrosis
, no radiopaque stone. Status post gastric sleeve surgery. No evidence of bowel 
obstruction or abnormal bowel wall thickening. Normal appendix. In the pelvis, 
bladder is normal. Patient is status post hysterectomy and bilateral salpingo-
oophorectomy. No recurrent mass is identified. No pelvic or retroperitoneal 
adenopathy. A previously seen mildly enlarged right pelvic sidewall lymph node 
only measures 5 mm. Osseous structures demonstrate degenerative changes. No 
suspicious bony lesion is identified. Impression: Status post hysterectomy and 
bilateral salpingo-oophorectomy. No recurrent or metastatic disease identified 
in the chest, abdomen or pelvis. Status postgastric sleeve surgery. Distended 
esophagus containing fluid, suggestive of gastroesophageal reflux.Signed: Clive Mesaort Verified Date/Time:  2020 14:21:52 Reading Location: [address] Consult Reading Room        Electronically signed by: CLIVE MESA M.D. on 2020 02:21 PMPOCT-GLUCOSE [id number] 12:25:00





 Test Item  Value  Reference Range  Comments

 

 POC-GLUCOSE METER (BEAKER)  137 mg/dL    : TESTED AT [address]



 (test tkwe=2691)      [facility], [address]:



       /Technician [id number]



       for Blairs, Kaylee



POCT-GLUCOSE [id number] 08:59:00





 Test Item  Value  Reference Range  Comments

 

 POC-GLUCOSE METER (BEAKER)  211 mg/dL    : TESTED AT [address]



 (test mahs=1593)      [facility], [address]:



       /Technician [id number]



       for Blairs, Kalyee



[id number] 04:54:00





 Test Item  Value  Reference Range  Comments

 

 PHOSPHORUS (BEAKER) (test code=604)  3.2 mg/dL  2.3-4.7  



 ID - RAFI [id number] 04:54:00





 Test Item  Value  Reference Range  Comments

 

 MAGNESIUM (BEAKER) (test code=627)  2.1 mg/dL  1.6-2.6  



 ID - RAFI WBASIC METABOLIC [id number] 04:54:00





 Test Item  Value  Reference Range  Comments

 

 SODIUM (BEAKER) (test  140 meq/L  136-145  



 hktf=384)      

 

 POTASSIUM (BEAKER) (test  3.7 meq/L  3.5-5.1  



 code=379)      

 

 CHLORIDE (BEAKER) (test  110 meq/L    



 code=382)      

 

 CO2 (BEAKER) (test  23 meq/L  22-29  



 code=355)      

 

 BLOOD UREA NITROGEN  22 mg/dL  7-21  



 (BEAKER) (test code=354)      

 

 CREATININE (BEAKER) (test  0.83 mg/dL  0.57-1.25  



 code=358)      

 

 GLUCOSE RANDOM (BEAKER)  136 mg/dL    



 (test code=652)      

 

 CALCIUM (BEAKER) (test  9.3 mg/dL  8.4-10.2  



 code=697)      

 

 EGFR (BEAKER) (test  69 mL/min/1.73 sq m    ESTIMATED GFR IS NOT AS



 code=1092)      ACCURATE AS CREATININE



       CLEARANCE IN PREDICTING



       GLOMERULAR FILTRATION



       RATE. ESTIMATED GFR IS



       NOT APPLICABLE FOR



       DIALYSIS PATIENTS.



 ROCKY MCKEE WCBC W/PLT COUNT &amp; AUTO [id number] 04:36:00





 Test Item  Value  Reference Range  Comments

 

 WHITE BLOOD CELL COUNT (BEAKER) (test code=775)  8.6 K/ L  3.5-10.5  

 

 RED BLOOD CELL COUNT (BEAKER) (test code=761)  4.13 M/ L  3.93-5.22  

 

 HEMOGLOBIN (BEAKER) (test code=410)  12.6 GM/DL  11.2-15.7  

 

 HEMATOCRIT (BEAKER) (test code=411)  36.0 %  34.1-44.9  

 

 MEAN CORPUSCULAR VOLUME (BEAKER) (test code=753)  87.2 fL  79.4-94.8  

 

 MEAN CORPUSCULAR HEMOGLOBIN (BEAKER) (test  30.5 pg  25.6-32.2  



 aved=950)      

 

 MEAN CORPUSCULAR HEMOGLOBIN CONC (BEAKER) (test  35.0 GM/DL  32.2-35.5  



 code=752)      

 

 RED CELL DISTRIBUTION WIDTH (BEAKER) (test  11.9 %  11.7-14.4  



 code=412)      

 

 PLATELET COUNT (BEAKER) (test code=756)  116 K/CU MM  150-450  

 

 MEAN PLATELET VOLUME (BEAKER) (test code=754)  11.7 fL  9.4-12.3  

 

 NUCLEATED RED BLOOD CELLS (BEAKER) (test  0 /100 WBC  0-0  



 code=413)      

 

 NEUTROPHILS RELATIVE PERCENT (BEAKER) (test  86 %    



 code=429)      

 

 LYMPHOCYTES RELATIVE PERCENT (BEAKER) (test  9 %    



 code=430)      

 

 MONOCYTES RELATIVE PERCENT (BEAKER) (test  4 %    



 code=431)      

 

 EOSINOPHILS RELATIVE PERCENT (BEAKER) (test  0 %    



 code=432)      

 

 BASOPHILS RELATIVE PERCENT (BEAKER) (test  0 %    



 code=437)      

 

 NEUTROPHILS ABSOLUTE COUNT (BEAKER) (test  7.40 K/ L  1.56-6.13  



 code=670)      

 

 LYMPHOCYTES ABSOLUTE COUNT (BEAKER) (test  0.78 K/ L  1.18-3.74  



 code=414)      

 

 MONOCYTES ABSOLUTE COUNT (BEAKER) (test  0.37 K/ L  0.24-0.36  



 code=415)      

 

 EOSINOPHILS ABSOLUTE COUNT (BEAKER) (test  0.00 K/ L  0.04-0.36  



 code=416)      

 

 BASOPHILS ABSOLUTE COUNT (BEAKER) (test  0.01 K/ L  0.01-0.08  



 code=417)      

 

 IMMATURE GRANULOCYTES-RELATIVE PERCENT (BEAKER)  1 %  0-1  



 (test code=2801)      



PROTHROMBIN [id number] 04:31:00





 Test Item  Value  Reference Range  Comments

 

 PROTIME (BEAKER) (test code=759)  13.6 seconds  11.9-14.2  

 

 INR (BEAKER) (test code=370)  1.1  <=5.9  



Effective 2019: PT Reference Range ChangeNew: 11.9-14.2  Previous: 11.7-
14.7RECOMMENDED COUMADIN/WARFARIN INR THERAPY RANGESSTANDARD DOSE: 2.0-3.0  
Includes: PROPHYLAXIS for venous thrombosis, systemic embolization; TREATMENT 
for venous thrombosis and/or pulmonary embolus.HIGH RISK: Target INR is2.5-3.5 
for patients wiht mechanical heart valves.[id number] 04:31:00





 Test Item  Value  Reference Range  Comments

 

 FIBRINOGEN LEVEL (BEAKER) (test code=658)  430 mg/dl  225-434  



[id number] 04:31:00





 Test Item  Value  Reference Range  Comments

 

 PARTIAL THROMBOPLASTIN TIME (BEAKER) (test  26.2 seconds  22.5-36.0  



 rzrd=036)      



MR, BRAIN, [id number] 00:06:00Please use STEALTH protocol.Deos the patient 
have an implanted electronic device?-&gt;NoFINAL REPORT PATIENT ID:   [id number] 
MR, BRAIN, WITH \T\ WITHOUT CONTRAST INDICATION: Neoplasm: head,metastatic 
TECHNIQUE: Multiplanar, multisequence MR imaging of the brain prior to and 
following intravenous administration of contrast. COMPARISON: MRI 10/7/2019 and 
prior exams dating back to 10/12/2018 FINDINGS:  Intracranial: Again noted are 
postsurgical changes of prior left frontal craniotomy andresection of 
underlying peripherally enhancing lesion in the left frontal lobe. Unchanged 
trace extra-axial collection and dural thickening underlying the craniotomy. 
Since 10/7/2019, there has been interval development of a large area of 
masslike enhancement adjacent to the medial resection margin, area of 
enhancement measures 3.8 cm AP by 2.9 cm TV by 3 cm CC. There is marked 
interval increase in surrounding vasogenic edema, involving the majority of the 
left frontal lobe and extending across the corpus callosum. Extensive sulcal 
effacement throughout the left cerebral hemisphere. There is a new 7 mm 
rightward midline shift. No cisternal effacement.  No additional intracranial 
mass or masslike enhancement.  No acute intracranial hemorrhage. There is new 
restricted diffusion along the medial resection margin and within the area of 
new masslike enhancement. No restricted diffusion elsewhere to suggest acute 
infarct. No hydrocephalus. Visualized intracranial flow voids are of normal 
course and caliber. Sinuses: No evidence of sinusitis. Mastoids are clear. 
Orbits: Globes are intact. Calvarium \T\ scalp: Unremarkable. IMPRESSION:
Interval development of a large area of masslike enhancement alongthe medial 
resection margin in the left frontal lobe, with extensive surrounding vasogenic 
edema involving the majority of the left frontal lobe and extending across the 
corpus callosum. Sulcal effacement throughout the left cerebral hemisphere and 
a new 7 mm rightward midline shift. Findings are highly concerning for tumor 
progression. Signed: Alyson Castle MDReport Verified Date/Time:  2020 00:
06:28   Electronically signed by: ALYSON CASTLE MD on 2020 12:
06 AMPOCT-GLUCOSE [id number] 22:11:00





 Test Item  Value  Reference Range  Comments

 

 POC-GLUCOSE METER (BEAKER)  188 mg/dL    : TESTED AT [address]



 (test nsno=8099)      [facility], [address]:



       /Technician [id number]



       for LAINE BIRMINGHAM



POCT-GLUCOSE [id number] 18:39:00





 Test Item  Value  Reference Range  Comments

 

 POC-GLUCOSE METER (BEAKER)  252 mg/dL    : Notified RN/MD: Will Repeat



 (test code=1538)      Test: TESTED AT [address], [address]:



       /Technician [id number]



       for Lillian Vicka



POCT-GLUCOSE [id number] 12:29:00





 Test Item  Value  Reference Range  Comments

 

 POC-GLUCOSE METER (BEAKER)  101 mg/dL    : TESTED AT [address]



 (test wieb=6611)      [facility], [address]:



       /Technician [id number]



       for Lillian Vicka



HEMOGLOBIN [id number] 12:22:00





 Test Item  Value  Reference Range  Comments

 

 HEMOGLOBIN A1C (BEAKER) (test code=368)  5.3 %  4.3-6.1  



HEPATIC FUNCTION [id number] 11:38:00





 Test Item  Value  Reference Range  Comments

 

 TOTAL PROTEIN (BEAKER) (test  7.5 gm/dL  6.0-8.3  Specimen slightly hemolyzed



 code=770)      

 

 ALBUMIN (BEAKER) (test  4.4 g/dL  3.5-5.0  Specimen slightly hemolyzed



 code=1145)      

 

 BILIRUBIN TOTAL (BEAKER) (test  0.5 mg/dL  0.2-1.2  Specimen slightly hemolyzed



 code=377)      

 

 BILIRUBIN DIRECT (BEAKER) (test  0.2 mg/dL  0.1-0.5  Specimen slightly 
hemolyzed



 code=706)      

 

 ALKALINE PHOSPHATASE (BEAKER)  109 U/L    



 (test code=346)      

 

 AST (SGOT) (BEAKER) (test  15 U/L  5-34  Specimen slightly hemolyzed



 code=353)      

 

 ALT (SGPT) (BEAKER) (test  8 U/L  6-55  Specimen slightly hemolyzed



 code=347)      



 ID - TREVA MPROTHROMBIN [id number] 11:22:00





 Test Item  Value  Reference Range  Comments

 

 PROTIME (DILCIA) (test code=759)  14.5 seconds  11.9-14.2  

 

 INR (BEAKER) (test code=370)  1.2  <=5.9  



Effective 2019: PT Reference Range ChangeNew: 11.9-14.2  Previous: 11.7-
14.7RECOMMENDED COUMADIN/WARFARIN INR THERAPY RANGESSTANDARD DOSE: 2.0-3.0  
Includes: PROPHYLAXIS for venous thrombosis, systemic embolization; TREATMENT 
for venous thrombosis and/or pulmonary embolus.HIGH RISK: Target INR is2.5-3.5 
for patients wiht mechanical heart valves.MR, BRAIN, WITH2019-10-07 11:31:
00Cyberknife simulation with mask; restaging after Cyberknife for brain 
metastasis Reason for Exam:-&gt;Cyberknife simulation with mask; restaging 
after Cyberknife for brain metastasisFINAL REPORT PATIENT ID:   [id number] MR, 
BRAIN, WITH \T\ WITHOUT CONTRAST INDICATION: Neoplasm: head,
metastaticCyberknife simulation with mask; restaging after Cyberknife for brain 
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 
multisequence MR imaging of the brain was obtainedbefore and after uneventful 
administration of gadolinium contrast. COMPARISON: 2019 FINDINGS:Left 
frontal resection cavity demonstrates grossly unchanged size when compared to 
the prior examination. There is faint peripheral enhancement without 
nodularity. No mass lesion has developed within the resection cavity or within 
the remaining portions of the brain parenchyma. Adjacent white matter 
hyperintensity extending to the ependymal surface is unchanged. Positioning of 
the calvarial flap remains anatomic.  Remaining portions of the brain 
parenchyma demonstrate no evidence of acute or subacute infarct. There has been 
no recent intracranial hemorrhage. The midline is normally positioned. 
Ventricular configuration is within normal limits. Brain structures along the 
midline and at the brain base are unremarkable. No abnormality of the skull 
base or calvarium is present. The included paranasalsinuses, mastoid air cells, 
and orbits are within normal limits.   IMPRESSION:  Although there is slight 
peripheral enhancement along the resection cavity in the current examination, 
there is no evidence of new nodular lesion in this location or within the 
remaining intracranial compartment. This may reflect treatment effect. 
Attention on follow-up advised. Signed: JR Mcneil Robert MDReport 
Verified Date/Time:  10/07/2019 11:31:07 Reading Location: [facility] 
Radiology Reading Room    Electronically signed by: VASHTI MCNEIL on 10
/2019 11:31 AMMR, BRAIN, [id number] 15:43:00Hx brain metastasis.  Please 
scan in treatment mask and include cyberknife protocol.FINAL REPORT PATIENT ID:
   [id number] MR, BRAIN, WITH \T\ WITHOUT CONTRAST INDICATION: Neoplasm: head,CNS
, rx monitor or f/u TECHNIQUE: Multiplanar, multisequence MR imaging of the 
brain was obtained. COMPARISON: 2019 FINDINGS: Prior left frontal 
craniotomy. Subjacent to the craniotomy there is no significant interval change 
in appearance of the left frontal resection cavity which demonstrates thin 
peripheral rim enhancement. No nodular enhancement. Two punctate foci of 
restricted diffusion along the posterior resection margin are present, the 
medial of which was present on prior exam 2019, with attention on 
follow-up recommended. No interval change in extent of T2/FLAIR hyperintensity 
extending from the resection margin to the frontal horn of the left lateral 
ventricle without significant mass effect. No new abnormal enhancement 
throughout the brain parenchyma. Remainder the brain parenchyma is 
unremarkable. Midline structures are normally developed. Cerebellar 
tonsilsremain low-lying but do not meet criteria for Chiari malformation. No 
hydrocephalus. Orbits are within normal limits. No obstructive paranasal sinus 
disease. IMPRESSION:  Status post resection of left frontal lesion without 
nodular enhancement within the left frontal lobe or elsewhere within the 
brainparenchyma.. Linear enhancement remains surrounding the resection cavity. 
Two foci of restricted diffusion of the posterior margin of the resection 
cavity are indeterminate with cellular tumor not excluded. Recommend attention 
on follow-up. FLAIR hyperintensity extending from the resection margin medially 
to the frontal horn of the left lateral ventricle is unchanged. Signed: Michelle Luque MDReportVerified Date/Time:  2019 15:43:51 Reading Location: [facility] Radiology Reading Room  Electronically signed by: MICHELLE LUQUE MD on 2019 03:43 [id number] 11:04:00





 Test Item  Value  Reference Range  Comments

 

 POC-CREATININE (BEAKER)  0.8 mg/dL  0.6-1.3  [place] [facility] [place]



 (test code=1859)      [facility]



       [id number]

 

 POC-EGFR (BEAKER) (test  72 mL/min/1.73M2    



 code=1860)      



TISSUE [id number] 12:10:00Surgical Pathology Report                         
Case: [id number]                                 Authorizing Provider:  Lora Wagner, Collected:           2019 Kasey REARDON   
                                                                      
OrderingLocation[facility]     [facility]         Received:            2019 1243           SERVICES                                                   
                Pathologist:        Camille Espinosa MD                  
                                Specimens:   A) -Fallopian Tube, Left &amp; 
Ovary, left tube and ovary                                            B) - 
Uterus w/Right Fallopian Tube &amp; Ovary                                      
                  C)- Cervix, additional anterior cervix                       
                                    D) - Lymph Node, right pelvic lymph node   
                                                       E) - Lymph Node, left 
pelvic lymph node                                              A. TUBE AND OVARY
, LEFT,SALPINGO-OOPHORECTOMY:        OVARY            - METASTATIC HIGH GRADE 
CARCINOMA         TUBE    - NO TUMOR PRESENT B. UTERUS, CERVIX, RIGHT TUBE AND 
OVARY, HYSTERECTOMY AND RIGHT SALPINGO-OOPHORECTOMY:        UTERUS             
  - MIXED HIGH GRADE ENDOMETRIAL CARCINOMA, COMPOSED OF SEROUS CARCINOMA AND 
ENDOMETRIOID CARCINOMA, FIGO GRADE 3               - TUMOR INVADES THROUGH THE 
MYOMETRIUMAND PENETRATES THE SEROSA               - LYMPHOVASCULAR SPACE 
INVASION PRESENT                - TUMOR INVOLVES THE LOWER UTERINE SEGMENT     
    CERVIX               - TUMOR INVOLVES ENDOCERVICAL GLANDS        RIGHT TUBE
              - NO TUMOR PRESENT         RIGHT OVARY             - NO TUMOR 
PRESENT              - ENDOSALPINGIOSIS C. ADDITIONAL ANTERIOR CERVIX, EXCISION
:           - FOCALLY ULCERATED SQUAMOUS MUCOSA, NO TUMOR PRESENT D. LYMPH NODES
, RIGHT PELVIC, DISSECTION:           - FOUR LYMPH NODES, NO TUMOR PRESENT (0/4)
E. LYMPH NODES, LEFT PELVIC, DISSECTION:           - FOUR LYMPH NODES, NO TUMOR 
PRESENT (0/4)      Signing Pathologist Direct Phone Line: [phone]
[phone] signed by Camille Espinosa MD on 2019 at 12:10 
PMThe tumor shows an admixture of serous and endometrioid carcinoma. The 
ovarian metastasis is predominantly serous carcinoma. Immunohistochemical 
stains performed on tumor in uterus and ovary show the following results: 
Uterus WT-1- negativeER- diffusely positive p16 - patchy [id number] - variable 
staining, with some foci of wild type and other foci of diffuse stainingOvaryWT-
1- negative ER- diffusely positive p16 - diffuse cphiljnql62 - diffuse 
[id number] X 188307 X 388305 X 188342 x 288341 x 6Malignant neoplasm of 
uterus A. Left tube and ovary. B. Uterus with right fallopian tube and ovary. 
C. Additional anterior cervix. D. Right pelvic lymph node. E. Left pelvic lymph 
node Part A. Labeled "left tube and ovary" consists of a 364 gm left salpingo-
oophorectomy with fallopian tube measuring 4 cm in length x 0.5 cm in diameter. 
The enlarged cystic ovary measures 14.5 x 12 x 3.5 cm and is received 
collapsed. The surface is primarily intact. It is inked and serially sectioned 
showing the entire ovary to be replaced by a tan, off-white, lobulated, firm 
mass with cystic changes. There is one unilocular cyst that was disrupted and 
measuring approximately 8 cm in greatest dimension. The locule has a smooth 
inner and outer lining.Representativesections are submitted as follows: A1, A2, 
entire fallopian tube; A3-A15, representative of mass; A16-A18, representative 
of cyst.Part B. Labeled "u; with right fallopian tube and ovary" consists of a 
151 gm hysterectomy with right fallopian tube and ovary. The right fallopian 
tube measures 5.5 cm in length x 0.4 cm in diameter. The right ovary measures 2 
x 1 x 0.5 cm. The fallopian tube and ovary are grossly unremarkable. The 
uterine corpus is irregular shaped and ragged and slightly nodular. The 
ectocervix measures 1.5 cm in greatest dimension. The external os is round 
measuring 0.5 cm.Ink code: Anterior uterus-blue and posterior-black.Grossly the 
entire endometrial cavity is replaced by a tan, off-white, multinodular, 
glistening mass measuring approximately 6 x 5.5 cm with a maximum depth of 2.5 
cm. The mass abuts the anterior and posterior serosa and is 3.5 cm from the 
anterior ectocervix and 4.5 cm from the posterior ectocervix.Section code: B1, 
B2, entire fallopian tube; B3, entire ovary;B4-B9, one contiguous section of 
anterior uterine wall from dome to ectocervix; B10-B15, additional one 
contiguous section of anterior wall from dome to ectocervix; B16-B20, posterior 
uterine wall fromdome to cervix; B21-B25, posterior uterine wall from dome to 
cervix.Part C. Labeled "additional anterior cervix" consists of an irregular 
fragment of tan hemorrhagic soft tissue measuring 1.5 x 0.8 x 0.5 cm. The 
specimen is serially sectioned and submitted entirely in C1.Part D. Labeled 
"right pelvic lymph node" consists of adipose tissue measuring 2 x 1.5 x 1 cm 
yielding two red lymph nodes measuring 0.8 x 1 cm. The specimen is entirely 
submitted as follows: D1, two lymph nodes; D2, remainder of fat.Part E. Labeled 
"left pelvic lymph node" consists of adipose tissue measuring 2 x 1.5 x 1 cm in 
aggregate consisting of three tan lymph nodes ranging from 0.8 to 1 cm. The 
specimen is entirely submitted as follows: E1, three lymph nodes; E2, remainder 
of fat. CG/ewPerformed.CAP CANCER SYNOPTIC REPORT: UterusSpecimen:  Uterine 
corpus, cervix, bilateral tubes and ovaries Procedure:  Hysterectomy and 
bilateral salpingo-oophorectomyLymph Node Sampling:  Performed, bilateral 
pelvicTumor Site:  Anterior and posterior endomyometriumHistological Type:  
Mixed carcinoma, combined serous and endometrioid FIGO Grade 3Depth of 
myometrial invasion: Tumo invades entire thickness and penetrates serosa 
Involvement of Cervix:  InvolvedExtensive Involvement of Other Organs:  Left 
ovarian metastasisLymph Vascular Invasion:  PresentPATHOLOGIC STAGING (TNM/FIGO)
: Stage IIIA Primary Tumor:  pT3a: Tumor invades through serosa and involves 
ovary Regional Lymph Nodes:  pN0:  No regional  lymph node metastasis  Number 
of Lymph Nodes Examined:  8  Number of Lymph Nodes Involved:  0 Distant 
Metastasis (pM):  Not applicableThe interpretation of this case included the 
use of immunohistochemistry or special stains. Immunohistochemistry technical 
testing was performed at [facility], Pathology Laboratory 
where it was developed and its performance characteristics were determined. It 
has not been cleared or approved by the U.S. Food and Drug Administration. The 
FDA has determined that such clearance orapproval is not necessary. The test is 
used for clinical purposes. It should not be regarded as investigational or for 
research. This laboratory is certified under the Clinical Laboratory 
Improvement Amendments of 1988 (CLIA-88) as qualified to perform high 
complexity clinical laboratory testing.MR, BRAIN, [id number] 12:36:00Please 
scan in mask and include cyberknife protocol.FINAL REPORT PATIENT ID:   
[id number] MR, BRAIN, WITH \T\ WITHOUT CONTRAST INDICATION: Neoplasm: head,CNS, 
rx monitor or f/umonitor for new brain metastasis, s/p radiation TECHNIQUE: 
Multiplanar, multisequence MR imaging of the brain was obtained before and 
after uneventful administration of gadoliniumcontrast. Thin section 
postcontrast imaging per CyberKnife protocol. COMPARISON: 2018 
FINDINGS:There is continued contraction of the resection cavity in the left 
frontal lobe with commensurate decreased prominence of adjacent underlying T2 
white matter infiltrative change. The periphery ofthe resection cavity again 
demonstrates postcontrast enhancement, but to a lesser degree than in 
2018 examination. No new lesions have developed. The brain 
parenchyma is otherwise unremarkable with no evidence of recent infarct or 
hemorrhage. There is no midline shift or ventriculomegaly. There is a stable, 
unremarkable positioning of the calvarial flap. Osseous structures demonstrate 
no acute abnormalities. There is no discernible paranasal sinus disease is. 
Orbital contents are unremarkable.  IMPRESSION:  Continuing contraction of 
resection cavity with decreasing peripheral enhancement. No new enhancement or 
nodularity to suggest recurrent or residual disease.  CyberKnife planning 
protocol. Signed: JR Mcneil Robert MDReport Verified Date/Time:  2019 12:36:31 Reading Location: [facility] Radiology Reading Room    
Electronically signed by: VASHTI Rocha 2019 12:36 PMCBC (
HEMOGRAM ONLY)2019 08:59:00





 Test Item  Value  Reference Range  Comments

 

 WHITE BLOOD CELL COUNT (BEAKER) (test  4.4 K/ L  3.5-10.5  



 code=775)      

 

 RED BLOOD CELL COUNT (BEAKER) (test  4.06 M/ L  3.93-5.22  



 code=761)      

 

 HEMOGLOBIN (BEAKER) (test code=410)  9.6 GM/DL  11.2-15.7  

 

 HEMATOCRIT (BEAKER) (test code=411)  31.5 %  34.1-44.9  Patient transfused

 

 MEAN CORPUSCULAR VOLUME (BEAKER) (test  77.6 fL  79.4-94.8  



 rozc=591)      

 

 MEAN CORPUSCULAR HEMOGLOBIN (BEAKER)  23.6 pg  25.6-32.2  



 (test code=751)      

 

 MEAN CORPUSCULAR HEMOGLOBIN CONC  30.5 GM/DL  32.2-35.5  



 (BEAKER) (test code=752)      

 

 RED CELL DISTRIBUTION WIDTH (BEAKER)  21.1 %  11.7-14.4  



 (test code=412)      

 

 PLATELET COUNT (BEAKER) (test  153 K/CU MM  150-450  



 aaqc=832)      

 

 MEAN PLATELET VOLUME (BEAKER) (test  9.6 fL  9.4-12.3  



 code=754)      

 

 NUCLEATED RED BLOOD CELLS (BEAKER)  0 /100 WBC  0-0  



 (test code=413)      



[id number] 15:42:00Medical Cytology Report                           
Case: [id number]                                 Authorizing Provider:  Lora Wagner, Collected:           2019 0934                 MD   
                                                                      
OrderingLocation:     [facility]         Received:            2019 1030           SERVICES                                                   
                Pathologist:        Tyrell Jaime MD                            
                                Specimen:    Peritoneal Fluid, Ascites         
                                                   PERITONEAL FLUID (CYTOSPINS 
AND CELL BLOCK):   - NEGATIVE FOR MALIGNANCY (SEE COMMENT)      Signing 
Pathologist Direct Phone Line: [phone] signed by Tyrell Jaime MD on 2019 at  3:42 PMCytospins and cell block sections show clusters of 
mesothelial cells and macrophages with reactive changes in a background of 
lymphocytes and scattered neutrophils. Immunostains performed on cell block 
sections show mesothelial cells are highlighted by calretinin and CAM5,2, while 
macrophages are highlighted by CD68. MOC31 is predominantly negative, while 
PAX8 highlights a few mesothelial cells. No diagnostic features of metastatic 
carcinoma is seen. 56372, 82915, 44485, 97812 x 4Ascites, history of 
endometrial carcinoma, status post laparoscopyPERITONEAL FLUID20 mls blood-
tinged; 4 cytospinsCollected: [id number]: [id number] 
interpretation of this case included the use of immunohistochemistry or special 
stains. Please see the immunohistochemistry results in the COMMENT section. The 
immunohistochemistry test was developed and its performance characteristics 
determined by [facility], Pathology Laboratory. It has not 
been cleared or approved by the U.S. Food and Drug Administration. The FDA has 
determined that such clearance or approval is not necessary. The test is used 
for clinical purposes. It should not be regarded as investigational or for 
research. This laboratory is certified under the Clinical Laboratory 
Improvement Amendments of 1988 (CLIA-88) as qualified to perform high 
complexity clinical laboratory testing.[facility], 
Department of Pathology, [address] [place] [address], Tel [phone]
[phone], [address] [place] [address], Tel [phone] District Hospital, 
Department of Pathology, [address] [place] [address], Tel [phone]
[phone] METABOLIC [id number] 05:52:00





 Test Item  Value  Reference Range  Comments

 

 SODIUM (BEAKER) (test  142 meq/L  136-145  



 czrq=269)      

 

 POTASSIUM (BEAKER) (test  3.5 meq/L  3.5-5.1  



 code=379)      

 

 CHLORIDE (BEAKER) (test  112 meq/L    



 code=382)      

 

 CO2 (BEAKER) (test  23 meq/L  22-29  



 code=355)      

 

 BLOOD UREA NITROGEN  10 mg/dL  7-21  



 (BEAKER) (test code=354)      

 

 CREATININE (BEAKER) (test  0.67 mg/dL  0.57-1.25  



 code=358)      

 

 GLUCOSE RANDOM (BEAKER)  80 mg/dL    



 (test code=652)      

 

 CALCIUM (BEAKER) (test  8.1 mg/dL  8.4-10.2  



 code=697)      

 

 EGFR (BEAKER) (test  89 mL/min/1.73 sq m    ESTIMATED GFR IS NOT AS



 code=1092)      ACCURATE AS CREATININE



       CLEARANCE IN PREDICTING



       GLOMERULAR FILTRATION



       RATE. ESTIMATED GFR IS



       NOT APPLICABLE FOR



       DIALYSIS PATIENTS.



CBC W/PLT COUNT &amp; AUTO [id number] 05:36:00





 Test Item  Value  Reference Range  Comments

 

 WHITE BLOOD CELL COUNT (BEAKER) (test code=775)  4.5 K/ L  3.5-10.5  

 

 RED BLOOD CELL COUNT (BEAKER) (test code=761)  3.25 M/ L  3.93-5.22  

 

 HEMOGLOBIN (BEAKER) (test code=410)  7.5 GM/DL  11.2-15.7  

 

 HEMATOCRIT (BEAKER) (test code=411)  24.9 %  34.1-44.9  

 

 MEAN CORPUSCULAR VOLUME (BEAKER) (test code=753)  76.6 fL  79.4-94.8  

 

 MEAN CORPUSCULAR HEMOGLOBIN (BEAKER) (test  23.1 pg  25.6-32.2  



 bppo=960)      

 

 MEAN CORPUSCULAR HEMOGLOBIN CONC (BEAKER) (test  30.1 GM/DL  32.2-35.5  



 code=752)      

 

 RED CELL DISTRIBUTION WIDTH (BEAKER) (test  21.3 %  11.7-14.4  



 code=412)      

 

 PLATELET COUNT (BEAKER) (test code=756)  126 K/CU MM  150-450  

 

 MEAN PLATELET VOLUME (BEAKER) (test code=754)  9.8 fL  9.4-12.3  

 

 NUCLEATED RED BLOOD CELLS (BEAKER) (test  0 /100 WBC  0-0  



 code=413)      

 

 NEUTROPHILS RELATIVE PERCENT (BEAKER) (test  59 %    



 code=429)      

 

 LYMPHOCYTES RELATIVE PERCENT (BEAKER) (test  23 %    



 code=430)      

 

 MONOCYTES RELATIVE PERCENT (BEAKER) (test  13 %    



 code=431)      

 

 EOSINOPHILS RELATIVE PERCENT (BEAKER) (test  5 %    



 code=432)      

 

 BASOPHILS RELATIVE PERCENT (BEAKER) (test  1 %    



 code=437)      

 

 NEUTROPHILS ABSOLUTE COUNT (BEAKER) (test  2.60 K/ L  1.56-6.13  



 code=670)      

 

 LYMPHOCYTES ABSOLUTE COUNT (BEAKER) (test  1.03 K/ L  1.18-3.74  



 code=414)      

 

 MONOCYTES ABSOLUTE COUNT (BEAKER) (test  0.57 K/ L  0.24-0.36  



 code=415)      

 

 EOSINOPHILS ABSOLUTE COUNT (BEAKER) (test  0.21 K/ L  0.04-0.36  



 code=416)      

 

 BASOPHILS ABSOLUTE COUNT (BEAKER) (test  0.03 K/ L  0.01-0.08  



 code=417)      

 

 IMMATURE GRANULOCYTES-RELATIVE PERCENT (BEAKER)  0 %  0-1  



 (test code=2801)      



BUN AND [id number] 09:15:00





 Test Item  Value  Reference Range  Comments

 

 BLOOD UREA NITROGEN  13 mg/dL  7-21  



 (BEAKER) (test code=354)      

 

 CREATININE (BEAKER) (test  0.71 mg/dL  0.57-1.25  



 code=358)      

 

 EGFR (BEAKER) (test  83 mL/min/1.73 sq m    ESTIMATED GFR IS NOT AS



 code=1092)      ACCURATE AS CREATININE



       CLEARANCE IN PREDICTING



       GLOMERULAR FILTRATION



       RATE. ESTIMATED GFR IS



       NOT APPLICABLE FOR



       DIALYSIS PATIENTS.



[id number] 06:20:00





 Test Item  Value  Reference Range  Comments

 

 SODIUM (BEAKER) (test code=381)  138 meq/L  136-145  

 

 POTASSIUM (BEAKER) (test code=379)  3.7 meq/L  3.5-5.1  

 

 CHLORIDE (BEAKER) (test code=382)  110 meq/L    

 

 CO2 (BEAKER) (test code=355)  21 meq/L  22-29  



CBC W/PLT COUNT &amp; AUTO [id number] 05:59:00





 Test Item  Value  Reference Range  Comments

 

 WHITE BLOOD CELL COUNT (BEAKER) (test code=775)  5.8 K/ L  3.5-10.5  

 

 RED BLOOD CELL COUNT (BEAKER) (test code=761)  3.66 M/ L  3.93-5.22  

 

 HEMOGLOBIN (BEAKER) (test code=410)  8.5 GM/DL  11.2-15.7  

 

 HEMATOCRIT (BEAKER) (test code=411)  27.8 %  34.1-44.9  

 

 MEAN CORPUSCULAR VOLUME (BEAKER) (test code=753)  76.0 fL  79.4-94.8  

 

 MEAN CORPUSCULAR HEMOGLOBIN (BEAKER) (test  23.2 pg  25.6-32.2  



 ylgz=377)      

 

 MEAN CORPUSCULAR HEMOGLOBIN CONC (BEAKER) (test  30.6 GM/DL  32.2-35.5  



 code=752)      

 

 RED CELL DISTRIBUTION WIDTH (BEAKER) (test  20.8 %  11.7-14.4  



 code=412)      

 

 PLATELET COUNT (BEAKER) (test code=756)  118 K/CU MM  150-450  

 

 MEAN PLATELET VOLUME (BEAKER) (test code=754)  9.9 fL  9.4-12.3  

 

 NUCLEATED RED BLOOD CELLS (BEAKER) (test  0 /100 WBC  0-0  



 code=413)      

 

 NEUTROPHILS RELATIVE PERCENT (BEAKER) (test  69 %    



 code=429)      

 

 LYMPHOCYTES RELATIVE PERCENT (BEAKER) (test  16 %    



 code=430)      

 

 MONOCYTES RELATIVE PERCENT (BEAKER) (test  13 %    



 code=431)      

 

 EOSINOPHILS RELATIVE PERCENT (BEAKER) (test  1 %    



 code=432)      

 

 BASOPHILS RELATIVE PERCENT (BEAKER) (test  1 %    



 code=437)      

 

 NEUTROPHILS ABSOLUTE COUNT (BEAKER) (test  4.03 K/ L  1.56-6.13  



 code=670)      

 

 LYMPHOCYTES ABSOLUTE COUNT (BEAKER) (test  0.95 K/ L  1.18-3.74  



 code=414)      

 

 MONOCYTES ABSOLUTE COUNT (BEAKER) (test  0.75 K/ L  0.24-0.36  



 code=415)      

 

 EOSINOPHILS ABSOLUTE COUNT (BEAKER) (test  0.03 K/ L  0.04-0.36  



 code=416)      

 

 BASOPHILS ABSOLUTE COUNT (BEAKER) (test  0.03 K/ L  0.01-0.08  



 code=417)      

 

 IMMATURE GRANULOCYTES-RELATIVE PERCENT (BEAKER)  0 %  0-1  



 (test code=2801)      



HEMOGLOBIN AND [id number] 11:31:00





 Test Item  Value  Reference Range  Comments

 

 HEMOGLOBIN (BEAKER) (test code=410)  9.0 g/dL  12.0-15.0  

 

 HEMATOCRIT (BEAKER) (test code=411)  26.0 %  36.0-45.0  



BLOOD GAS, [id number] 10:46:00





 Test Item  Value  Reference Range  Comments

 

 PH VENOUS (BEAKER) (test code=701)  7.42  7.32-7.42  

 

 PCO2 VENOUS (BEAKER) (test code=755)  33 mm Hg  41-51  

 

 PO2 VENOUS (BEAKER) (test code=702)  52 mm Hg  25-40  

 

 O2 SATURATION VENOUS (BEAKER) (test code=703)  87.6 %  40.0-70.0  

 

 HCO3 VENOUS (BEAKER) (test code=705)  20 mmol/L  21-29  

 

 BASE EXCESS VENOUS (BEAKER) (test code=704)  -3.7 mmol/L  -2.0-3.0  



CALCIUM, [id number] 10:46:00





 Test Item  Value  Reference Range  Comments

 

 CALCIUM IONIZED (BEAKER) (test code=698)  1.03 mmol/L  1.12-1.27  

 

 PH, BLOOD (BEAKER) (test code=1810)  7.42    



CBC W/PLT COUNT &amp; AUTO [id number] 07:19:00





 Test Item  Value  Reference Range  Comments

 

 WHITE BLOOD CELL COUNT (BEAKER) (test code=775)  3.1 K/ L  3.5-10.5  

 

 RED BLOOD CELL COUNT (BEAKER) (test code=761)  3.86 M/ L  3.93-5.22  

 

 HEMOGLOBIN (BEAKER) (test code=410)  8.3 GM/DL  11.2-15.7  

 

 HEMATOCRIT (BEAKER) (test code=411)  28.8 %  34.1-44.9  

 

 MEAN CORPUSCULAR VOLUME (BEAKER) (test code=753)  74.6 fL  79.4-94.8  

 

 MEAN CORPUSCULAR HEMOGLOBIN (BEAKER) (test  21.5 pg  25.6-32.2  



 nbpx=497)      

 

 MEAN CORPUSCULAR HEMOGLOBIN CONC (BEAKER) (test  28.8 GM/DL  32.2-35.5  



 code=752)      

 

 RED CELL DISTRIBUTION WIDTH (BEAKER) (test  22.5 %  11.7-14.4  



 code=412)      

 

 PLATELET COUNT (BEAKER) (test code=756)  149 K/CU MM  150-450  

 

 MEAN PLATELET VOLUME (BEAKER) (test code=754)  10.4 fL  9.4-12.3  

 

 NUCLEATED RED BLOOD CELLS (BEAKER) (test  0 /100 WBC  0-0  



 code=413)      

 

 NEUTROPHILS RELATIVE PERCENT (BEAKER) (test  57 %    



 code=429)      

 

 LYMPHOCYTES RELATIVE PERCENT (BEAKER) (test  22 %    



 code=430)      

 

 MONOCYTES RELATIVE PERCENT (BEAKER) (test  14 %    



 code=431)      

 

 EOSINOPHILS RELATIVE PERCENT (BEAKER) (test  7 %    



 code=432)      

 

 BASOPHILS RELATIVE PERCENT (BEAKER) (test  1 %    



 code=437)      

 

 NEUTROPHILS ABSOLUTE COUNT (BEAKER) (test  1.77 K/ L  1.56-6.13  



 code=670)      

 

 LYMPHOCYTES ABSOLUTE COUNT (BEAKER) (test  0.69 K/ L  1.18-3.74  



 code=414)      

 

 MONOCYTES ABSOLUTE COUNT (BEAKER) (test  0.43 K/ L  0.24-0.36  



 code=415)      

 

 EOSINOPHILS ABSOLUTE COUNT (BEAKER) (test  0.22 K/ L  0.04-0.36  



 code=416)      

 

 BASOPHILS ABSOLUTE COUNT (BEAKER) (test  0.02 K/ L  0.01-0.08  



 code=417)      

 

 IMMATURE GRANULOCYTES-RELATIVE PERCENT (BEAKER)  0 %  0-1  



 (test code=2801)      



[id number] 11:42:00





 Test Item  Value  Reference Range  Comments

 

 SODIUM (BEAKER) (test code=381)  142 meq/L  136-145  

 

 POTASSIUM (BEAKER) (test code=379)  4.2 meq/L  3.5-5.1  

 

 CHLORIDE (BEAKER) (test code=382)  108 meq/L    

 

 CO2 (BEAKER) (test code=355)  27 meq/L  22-29  



BUN AND [id number] 11:42:00





 Test Item  Value  Reference Range  Comments

 

 BLOOD UREA NITROGEN  16 mg/dL  7-21  



 (BEAKER) (test code=354)      

 

 CREATININE (BEAKER) (test  0.81 mg/dL  0.57-1.25  



 code=358)      

 

 EGFR (BEAKER) (test  71 mL/min/1.73 sq m    ESTIMATED GFR IS NOT AS



 code=1092)      ACCURATE AS CREATININE



       CLEARANCE IN PREDICTING



       GLOMERULAR FILTRATION



       RATE. ESTIMATED GFR IS



       NOT APPLICABLE FOR



       DIALYSIS PATIENTS.



[id number] 11:09:00





 Test Item  Value  Reference Range  Comments

 

 HEMOGLOBIN (BEAKER) (test code=410)  8.2 GM/DL  11.2-15.7  



PLATELET [id number] 11:09:00





 Test Item  Value  Reference Range  Comments

 

 PLATELET COUNT (BEAKER) (test code=756)  109 K/CU MM  150-450  



CT, CHEST, WITH [id number] 11:36:00FINAL REPORT PATIENT ID:   [id number] 
CT of the chest, with contrast Clinical History:  LUNG NODULES, UTERINE CANCER 
Technique: CT of the chest is performed with intravenous contrast 
administration. Thisexam was performed according to our departmental dose 
optimization program which includes automated exposure control, adjustment of 
the mA and/or kV according to patient's size and/or use of iterative 
reconstructive technique. Comparison Film:  2018 Discussion: Exam 
is mildly degraded by patient's respiratory motion. There is a right-sided Port-
A-Cath. Visualized thyroid gland is normal. There is no subclavicular, axillary
, mediastinal or hilar lymphadenopathy. Heart and pericardium are unremarkable. 
Previously seen pulmonary nodules have resolved. No new mass or consolidation. 
There isno pleural effusion. Central airways are patent, no bronchiectasis, or 
bronchial wall thickening. Please refer to recently performed abdomen CT for 
discussion of intra-abdominal findings. Osseous structures demonstrate 
degenerative changes. No suspicious bony lesion is identified. Impression: 
Resolution of previously seen pulmonary nodules, compatible with treatment 
response. Signed: Clive Mesaort Verified Date/Time:  2019 11:36:19 
Reading Location: [facility] C013 CT Body Reading RoomElectronically signed by: 
CLIVE MESA M.D. on 2019 11:36 [place], [id number] 11:04:00FINAL 
REPORT PATIENT ID:   [id number]  CT of the abdomen and pelvis, with contrast 
Clinical History:  MALIGNANT NEOPLASM UTERUS Technique: CT of the abdomen and 
pelvis is performed with intravenous contrast administration.  This exam was 
performed according to our departmental dose optimization program which 
includes automated exposure control, adjustment of the mA and/or kV according 
to patient's sizeand/or use of iterative reconstructive technique. Comparison 
Film:  None Discussion: There is apparent intraluminal nodule or lesion versus 
folded mucosa in the distal esophagus. At the lung bases, previously seen 1.4 
cm right lower lobe nodule is no longer identified. No liver lesion is seen. 
Heterogeneous density within the gallbladder may reflect sludge is no biliary 
ductal dilatation. Spleen, pancreas, and adrenal glands are unremarkable. 
Kidneys demonstrate no hydronephrosis, or radiopaque stone. At the left upper 
pole, there is a 2.2 cm cyst. Status post gastric sleeve surgery. No evidence 
ofbowel obstruction and no abnormal bowel wall thickening is identified. In the 
pelvis, there is a large mixed solid and cystic mass that measures 
approximately 16.4 x 13.1 x 12.3 cm; it is inseparable from the uterine fundus, 
which also has a heterogeneous appearance, as well as both adnexa. It has grown 
since the prior exam, although the solid component appears smaller. There is a 
right pelvic lymph node, near the bifurcation of common iliac vessels, 
measuring 1 cm, and previously it measures 1.5 cm(prior exam axial image 95), 
compatible with treatment response. No new adenopathy. Bladder is unremarkable. 
No ascites or effusion retroperitoneal lymph nodes are unchanged. Osseous 
structures demonstrate degenerative changes. No suspicious bony lesion is 
identified. Impression: Large mixed solid andcystic pelvic mass inseparable 
from uterine fundus, and bilateral adnexa; while it has grown in overall size, 
the solid component appears smaller. A right iliac chain lymph node has also 
decreased in size, suggestive of treatment response. Resolution of a previously 
seen right lower lung metastatic deposit. Intraluminal nodule versus folded 
mucosa in the distal esophagus, suggest close attention on follow-up exam. 
Status post gastric sleeve surgery. Signed: Clive Mesa MDReport Verified Date/Time
:  2019 11:04:56 Reading Location: [address] Radiology Reading Room  
      Electronically signed by: CLIVE MESA M.D. on 2019 11:04 AMPOCT-
[id number] 09:37:00





 Test Item  Value  Reference Range  Comments

 

 POC-CREATININE (DILCIA)  0.7 mg/dL  0.6-1.3  [place] [facility] [facility]



 (test code=1859)      [facility]



       [id number]

 

 POC-EGFR (DILCIA) (test  84 mL/min/1.73M2    



 rwgt=1596)      



ANG, TUNNEL CATH CENTRAL INS W/PORT -[id number] 14:21:00Reason for Exam:-&gt;
c54.1FINAL REPORT PATIENT ID:   [id number]  Chest praveen cath insertion: Pertinent 
clinical information: C 54.1 Modality: Sonography and fluoroscopy Conscious 
Sedation: Versed 1 mg and fentanyl 50 mcg intravenouslyDuring the procedure 
with conscious sedation, the patient was monitored continuously with pulse 
oximetry and electrocardiography by the attending physician and registered 
nurse.  Patient Physician face to face intraservice time: 40 minutes Antibiotics
: Vancomycin 1 g intravenously Fluoro time in minutes and number of images: 1.1 
minutes. Three images. Anesthesia:  Two percent Lidocaine injected 
subcutaneously at the insertion site and tunnel. Approach: Right internal 
jugular veinFor maximum sterile barrier protection a mask, cap, sterile gloves, 
sterile drape, sterile gown, and a cutaneous antiseptic was utilized. Technique
:  After informed written consent was obtained, the patient was preppedand 
draped in the usual sterile manner.  Access was obtained using sonographic 
guidance. Hard copy images of the vein were submitted for interpretation.  
Ultrasound was used to document patency, compressibility and decrease 
unnecessary punctures.  The right internal jugular vein was catheterized.  A 
guide wire  was advanced centrally.  A 20 cm 7 [place] Passport catheter was 
advanced with its distal tip terminating at the cavoatrial junction.  The 
catheter was flushed and aspirated easily following placement.  A subcutaneous 
pocket was created in the right anterior chest wall by blunt dissection.  The 
pocket was lavaged with an antimicrobial solution.The pocket was then closed 
using a running subcuticular 3.0 Vicryl suture.  The port was then accessed 
following closure and flushed with a saline solution.  Vital signs were 
monitored throughout the procedure by a nurse and the radiologist, and remained 
stable.  The patient tolerated the procedure well and left the department in 
the same condition.Results:  Spot radiographs of the chest and upper arm 
demonstrate the new chest port catheter to liein the expected position with its 
tip overlying the cavoatrial junction. Impression:Successful, uncomplicated 
placement of a right chest praveen cath.  This catheter is a power port which is 
rated for power injections if needed.    Signed: Rachell Eisenberg Verified 
Date/Time:  2018 14:21:30 Reading Location: [place] [address] Angio Body 
Reading Room      Electronically signed by: RACHELL EISENBERG M.D.on 2018 02:
21 [id number] 07:24:00





 Test Item  Value  Reference Range  Comments

 

 PROTIME (BEAKER) (test code=759)  13.5 seconds  11.7-14.7  

 

 INR (BEAKER) (test code=370)  1.0  <=5.9  

 

 PARTIAL THROMBOPLASTIN TIME (BEAKER) (test  27.0 seconds  22.5-36.0  



 code=760)      



RECOMMENDED COUMADIN/WARFARIN INR THERAPY RANGESSTANDARD DOSE: 2.0 - 3.0   
Includes: PROPHYLAXIS forvenous thrombosis, systemic embolization; TREATMENT 
for venous thrombosis and/or pulmonary embolus.HIGH RISK: Target INR is 2.5-3.5 
for patients with mechanical heart valves.CBC W/PLT COUNT &amp; AUTO 
[id number] 07:22:00





 Test Item  Value  Reference Range  Comments

 

 WHITE BLOOD CELL COUNT (BEAKER) (test code=775)  7.4 K/ L  3.5-10.5  

 

 RED BLOOD CELL COUNT (BEAKER) (test code=761)  4.14 M/ L  3.93-5.22  

 

 HEMOGLOBIN (BEAKER) (test code=410)  8.9 GM/DL  11.2-15.7  

 

 HEMATOCRIT (BEAKER) (test code=411)  30.9 %  34.1-44.9  

 

 MEAN CORPUSCULAR VOLUME (BEAKER) (test code=753)  74.6 fL  79.4-94.8  

 

 MEAN CORPUSCULAR HEMOGLOBIN (BEAKER) (test  21.5 pg  25.6-32.2  



 nagz=545)      

 

 MEAN CORPUSCULAR HEMOGLOBIN CONC (BEAKER) (test  28.8 GM/DL  32.2-35.5  



 code=752)      

 

 RED CELL DISTRIBUTION WIDTH (BEAKER) (test  19.9 %  11.7-14.4  



 code=412)      

 

 PLATELET COUNT (BEAKER) (test code=756)  363 K/CU MM  150-450  

 

 MEAN PLATELET VOLUME (BEAKER) (test code=754)  9.0 fL  9.4-12.3  

 

 NUCLEATED RED BLOOD CELLS (BEAKER) (test  0 /100 WBC  0-0  



 code=413)      

 

 NEUTROPHILS RELATIVE PERCENT (BEAKER) (test  72 %    



 code=429)      

 

 LYMPHOCYTES RELATIVE PERCENT (BEAKER) (test  15 %    



 code=430)      

 

 MONOCYTES RELATIVE PERCENT (BEAKER) (test  9 %    



 code=431)      

 

 EOSINOPHILS RELATIVE PERCENT (BEAKER) (test  2 %    



 code=432)      

 

 BASOPHILS RELATIVE PERCENT (BEAKER) (test  1 %    



 code=437)      

 

 NEUTROPHILS ABSOLUTE COUNT (BEAKER) (test  5.33 K/ L  1.56-6.13  



 code=670)      

 

 LYMPHOCYTES ABSOLUTE COUNT (BEAKER) (test  1.14 K/ L  1.18-3.74  



 code=414)      

 

 MONOCYTES ABSOLUTE COUNT (BEAKER) (test  0.67 K/ L  0.24-0.36  



 code=415)      

 

 EOSINOPHILS ABSOLUTE COUNT (BEAKER) (test  0.14 K/ L  0.04-0.36  



 code=416)      

 

 BASOPHILS ABSOLUTE COUNT (BEAKER) (test  0.07 K/ L  0.01-0.08  



 code=417)      

 

 IMMATURE GRANULOCYTES-RELATIVE PERCENT (BEAKER)  0 %  0-1  



 (test code=2801)      



MISCELLANEOUS LAB [id number] 09:11:00





 Test Item  Value  Reference Range  Comments

 

 SCAN RESULT (test code=1813000)      



MR, BRAIN, [id number] 15:08:00FINAL REPORT PATIENT ID:   [id number] MRI Brain 
with and without contrast INDICATION: Brain metastasis. TECHNIQUE: Multiplanar, 
multisequence MR imaging of the brain was performed, utilizing the following 
imaging sequences: Axial T1, T2, FLAIR, DWI, GRE; sagittal T1; postcontrast 
axial, sagittal, and coronal T1. High resolution axial postcontrast T1-weighted 
images were obtained per [employer] protocol. COMPARISON: MRI brain 10/16/2018, 
10/12/2018 FINDINGS:There is interval mild retraction of the leftfrontal lobe 
operative cavity, near complete resolution of pneumocephalus, and decreased 
left frontal lobe T2/FLAIR signal abnormality suggesting improved edema. There 
is greater irregular enhancement along the operative cavity margins, thickest 
laterally and inferiorly where it measures up to 7 mm. This probably reflects 
evolving granulation changes. Residual neoplasm cannot be excluded. A small 
extra axial collection left frontal craniotomy site measures up to 8 mm with 
adjacent dural enhancement suggesting reactive granulation changes. There is 
improved mass effect with 2-3 mm rightward midline shift at the frontal falx 
level. There is no hydrocephalus. There is left frontal operative site 
hemosiderin is seen. Staining and mild residual ischemic edema. No new/acute 
ischemia is seen. The major vascular flow voids are maintained. There is stable 
mild cerebellar tonsillar ectopia. There is mild chronic polypoid sinus mucosal 
disease with well aerated mastoid air cells. The sella and orbits are 
unremarkable. There is no craniotomy offset or remarkable subgaleal collection. 
IMPRESSION: 1. Since 10/16/2018, evolution of postoperative changes with mild 
left frontal operative cavity contraction and improved surrounding edema 
pattern. Greater cavity margin enhancement likely reflects granulation changes, 
though residual neoplasm cannot be excluded. Advise attention at MR follow up. 
2. Improved mass effect with minimal current midline shift. Signed: Derrek Steve MDReport Verified Date/Time:  2018 15:08:03 Reading Location: 
[address] Neuro Reading Room   Electronically signed by:DERREK STEVE M.D. on 2018 03:08 [id number] 11:54:00





 Test Item  Value  Reference Range  Comments

 

 POC-CREATININE (BEAKER)  0.8 mg/dL  0.6-1.3  TESTED AT [facility]



 (test code=1859)      [facility]



       [address]

 

 POC-EGFR (BEAKER) (test  72 mL/min/1.73M2    



 code=1860)      



TISSUE [id number] 12:18:00Surgical Pathology Report                         
Case: [id number]                                 Authorizing Provider:  Dennis Alvarez MD            Collected:           10/15/2018 1216            Ordering 
Location:     [facility] PERIOPERATIVE         Received:            10/15/2018 1220  
            SERVICES                                                           
        Pathologist:           Kalpesh Rod MD                             
                           Specimens:   A) - Tumor, left frontal brain mass    
                                                           B) -Tumor, left 
frontal tumor                                                       Additionally
, inhibin and CD56 immunoperoxidase stains were performed. CD56 stains 
background neuropil and a rare tumor cell. Inhibin is negative in oilkz81298 x 
2 additionallyAddendum electronically signed by Kalpesh Rod MD on 10/24/
2018 at 12:18 PMA. BRAIN, LEFT FRONTAL, CRANIOTOMY:METASTATIC CARCINOMA (SEE 
COMMENT)B. BRAIN, LEFT FRONTAL, CRANIOTOMY:METASTATIC CARCINOMA (SEE COMMENT)  
    Signing Pathologist Direct Phone Line: [phone] signed by 
Kalpesh Rod MD on 10/23/2018 at  5:57 PMBoth specimens consist of 
metastatic carcinoma to brain. Immunoperoxidase stains for PAX-8, CAM 5.2 and 
estrogen receptors are strongly and diffusely positive in tumor cells. The Ki67 
proliferation index is 80-90% in most areas. Immunoperoxidase stains for p63, 
synaptophysin, RCC, GCDFP, WT1, napsin A, cytokeratin 7, cytokeratin 20, 
mammoglobin, MONROE-3, CK 5/6, , and TTF-1 are negative in tumor. The 
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 
of Mullerian origin. Clinical correlation is recommended.88307 x 2; 82760; 61697
; 88341 x 15; 60984Glcflmb/periuterine massLeft front brain mass; left frontal 
tumorA. Received fresh for intraoperative frozend consultation labeled "tumor" 
is a 1.5 x 1.0 x 0.5 cm aggregate of multiple irregular tan-pink portion of 
tissues whichare submitted for frozen section in cassette FSA1. The remainder 
of the specimen is submitted in A2. MW/plB. The specimen is received in a 
fluidless container labeled with patient information and labeled "left frontal 
tumor" and consists of multiple fragments of tan-red soft tissue measuring 2.5 
x 2.5 x 0.6 cm in aggregate. Submitted entirely  B1. CG/pl BRAIN, LEFT FRONTAL, 
BIOPSY OF MASS:  - METASTATIC CARCINOMA Reported by Dr. Rod to Dr. Alvarez on 
Oct 15, 2018 at 12:40 p.m. Performed on A and BThe following special studies 
were performed on this case and the interpretation is incorporated in the 
diagnostic report above:CK7, CK20, Cam 5.2, mammoglobin, MONROE-3, CK 5/6, 
, TTF-1, p63, PAX-8,synaptophysin, Ki-67, RCC, GCDFP, estrogen receptor, WT-1, 
and Napsin-AThe immunohistochemistry testwas developed and its performance 
characteristics determined by [facility] Pathology 
Laboratory. It has not been cleared or approved by the U.S. Food and Drug 
Administration. The FDAhas determined that such clearance or approval is not 
necessary. The test is used for clinical purposes. It should not be regarded as 
investigational or for research. This laboratory is certified underthe Clinical 
Laboratory Improvement Amendments of 1988 (CLIA-88) as qualified to perform 
high complexity clinical laboratory testing.BASIC METABOLIC PANEL2018-10-17 05:
52:00





 Test Item  Value  Reference Range  Comments

 

 SODIUM (BEAKER) (test  140 meq/L  136-145  



 batt=511)      

 

 POTASSIUM (BEAKER) (test  3.8 meq/L  3.5-5.1  



 code=379)      

 

 CHLORIDE (BEAKER) (test  104 meq/L    



 code=382)      

 

 CO2 (BEAKER) (test  27 meq/L  22-29  



 code=355)      

 

 BLOOD UREA NITROGEN  18 mg/dL  7-21  



 (BEAKER) (test code=354)      

 

 CREATININE (BEAKER) (test  0.69 mg/dL  0.57-1.25  



 code=358)      

 

 GLUCOSE RANDOM (BEAKER)  120 mg/dL    



 (test code=652)      

 

 CALCIUM (BEAKER) (test  8.7 mg/dL  8.4-10.2  



 code=697)      

 

 EGFR (BEAKER) (test  86 mL/min/1.73 sq m    ESTIMATED GFR IS NOT AS



 code=1092)      ACCURATE AS CREATININE



       CLEARANCE IN PREDICTING



       GLOMERULAR FILTRATION



       RATE. ESTIMATED GFR IS



       NOT APPLICABLE FOR



       DIALYSIS PATIENTS.



HEMOGLOBIN AND HEMATOCRIT2018-10-17 05:24:00





 Test Item  Value  Reference Range  Comments

 

 HEMOGLOBIN (BEAKER) (test code=410)  9.1 GM/DL  11.2-15.7  

 

 HEMATOCRIT (BEAKER) (test code=411)  30.1 %  34.1-44.9  



MR, BRAIN, [id number] 16:25:00Prior to dischargeFINAL REPORT PATIENT ID:   
[id number] MRI Brain with and without contrast 10/16/2018 4:22 PM CLINICAL HISTORY
: post operative TECHNIQUE: Multiplanar, multisequence MR imaging of the brain 
was performed, utilizing the following imaging sequences: Axial T1, T2, FLAIR, 
GRE, DWI/ADC; sagittal T1; postcontrast axial, sagittal, and coronal T1. 
COMPARISON: 10/12/2018. FINDINGS: No residual enhancing tumor is evident. 
Nonenhancing signal abnormality in the left frontal lobe is stable.  There has 
been marginalrelief of mass effect, with midline shift now measuring 6 mm at 
the septi pellucidi (previously 9 mm). There is persistent subfalcine 
protrusion of the paramedian left frontal lobe.  There is no organized hematoma
, arterial or venous distribution infarct, hydrocephalus, or concerning extra-
axial collection. There is no craniotomy offset or remarkable subgaleal 
collection. Normal appearing flow-voids remain present in the major 
intracranial vascular structures. IMPRESSION: 1. Negative for residual 
enhancing tumor.2. Nonworrisome postoperative appearance. Signed: Seipel, Timothy MDReport Verified Date/Time:  10/16/2018 16:25:22 Reading Location: [facility] Radiology Reading Room    Electronicallysigned by: TIMOTHY J SEIPEL, M.D. on 10/16/2018 04:25 PMHEMOGLOBIN AND HEMATOCRIT2018-10-16 15:49:00





 Test Item  Value  Reference Range  Comments

 

 HEMOGLOBIN (BEAKER) (test code=410)  8.8 GM/DL  11.2-15.7  

 

 HEMATOCRIT (BEAKER) (test code=411)  29.1 %  34.1-44.9  



TISSUE [id number] 11:28:00Surgical Pathology Report                         
Case: [id number]                                 Authorizing Provider:  Lora Wagner, Collected:           10/12/2018 Xiomara REARODN   
                                                                      
OrderingLocation:     [place] [address] ICU        Received:            10/12/
2018 [address]ia            Pathologist:           Camille Espinosa MD          
                                        Specimen:    Curettings, Endometrial, 
endometrial bx                                              ENDOMETRIUM, 
CURETTAGE:  - PREDOMINANTLY BLOOD WITH VERY SMALL FRAGMENTS OF ATYPICAL AND 
CROWDED GLANDS (See Comment)    Signing Pathologist Direct Phone Line: [phone]
[phone] signed by Camille Espinosa MD on 10/16/2018 at 11:28 
AMThe biopsy is suboptimal and the findings are worrisome. Procurement of 
additional tissue is recommended.88305 x 1 Endometrial curettingsThe specimen 
is received in a formalin-filled container and labeled with the patient's 
information labeled "endometrial curettings" andconsists of bloody soft tissue 
measuring 1.5 x 0.4 x 0.2 cm in aggregate. Submitted entirely A1. CG/pl 
Performed.BASIC METABOLIC [id number] 04:14:00





 Test Item  Value  Reference Range  Comments

 

 SODIUM (BEAKER) (test  140 meq/L  136-145  



 ughn=940)      

 

 POTASSIUM (BEAKER) (test  4.1 meq/L  3.5-5.1  



 code=379)      

 

 CHLORIDE (BEAKER) (test  107 meq/L    



 code=382)      

 

 CO2 (BEAKER) (test  24 meq/L  22-29  



 code=355)      

 

 BLOOD UREA NITROGEN  15 mg/dL  7-21  



 (BEAKER) (test code=354)      

 

 CREATININE (BEAKER) (test  0.71 mg/dL  0.57-1.25  



 code=358)      

 

 GLUCOSE RANDOM (BEAKER)  127 mg/dL    



 (test code=652)      

 

 CALCIUM (BEAKER) (test  8.5 mg/dL  8.4-10.2  



 code=697)      

 

 EGFR (BEAKER) (test  83 mL/min/1.73 sq m    ESTIMATED GFR IS NOT AS



 code=1092)      ACCURATE AS CREATININE



       CLEARANCE IN PREDICTING



       GLOMERULAR FILTRATION



       RATE. ESTIMATED GFR IS



       NOT APPLICABLE FOR



       DIALYSIS PATIENTS.



CBC (HEMOGRAM ONLY)2018-10-16 04:08:00





 Test Item  Value  Reference Range  Comments

 

 WHITE BLOOD CELL COUNT  14.2 K/ L  3.5-10.5  



 (BEAKER) (test code=775)      

 

 RED BLOOD CELL COUNT (BEAKER)  3.79 M/ L  3.93-5.22  



 (test code=761)      

 

 HEMOGLOBIN (BEAKER) (test  8.4 GM/DL  11.2-15.7  



 code=410)      

 

 HEMATOCRIT (BEAKER) (test  28.2 %  34.1-44.9  



 code=411)      

 

 MEAN CORPUSCULAR VOLUME  74.4 fL  79.4-94.8  



 (BEAKER) (test code=753)      

 

 MEAN CORPUSCULAR HEMOGLOBIN  22.2 pg  25.6-32.2  



 (BEAKER) (test code=751)      

 

 MEAN CORPUSCULAR HEMOGLOBIN  29.8 GM/DL  32.2-35.5  



 CONC (BEAKER) (test code=752)      

 

 RED CELL DISTRIBUTION WIDTH   %  11.7-14.4  Unable to report due to



 (BEAKER) (test code=412)      abnormal RBC population



       distribution.

 

 PLATELET COUNT (BEAKER) (test  220 K/CU MM  150-450  



 qsif=506)      

 

 MEAN PLATELET VOLUME (BEAKER)  10.8 fL  9.4-12.3  



 (test code=754)      

 

 NUCLEATED RED BLOOD CELLS  0 /100 WBC  0-0  



 (BEAKER) (test code=413)      



POCT-GLUCOSE [id number] 08:16:00





 Test Item  Value  Reference Range  Comments

 

 POC-GLUCOSE METER (BEAKER)  114 mg/dL    TESTED AT [facility] [address]



 (test unkj=8751)      [place] [address]



CBC W/PLT COUNT &amp; AUTO DIFFERENTIAL2018-10-15 07:35:00





 Test Item  Value  Reference Range  Comments

 

 WHITE BLOOD CELL COUNT  9.9 K/ L  3.5-10.5  



 (BEAKER) (test code=775)      

 

 RED BLOOD CELL COUNT (BEAKER)  4.48 M/ L  3.93-5.22  



 (test code=761)      

 

 HEMOGLOBIN (BEAKER) (test  9.8 GM/DL  11.2-15.7  



 code=410)      

 

 HEMATOCRIT (BEAKER) (test  33.3 %  34.1-44.9  



 code=411)      

 

 MEAN CORPUSCULAR VOLUME  74.3 fL  79.4-94.8  



 (BEAKER) (test code=753)      

 

 MEAN CORPUSCULAR HEMOGLOBIN  21.9 pg  25.6-32.2  



 (BEAKER) (test code=751)      

 

 MEAN CORPUSCULAR HEMOGLOBIN  29.4 GM/DL  32.2-35.5  



 CONC (BEAKER) (test code=752)      

 

 RED CELL DISTRIBUTION WIDTH   %  11.7-14.4  Unable to report due to



 (BEAKER) (test code=412)      abnormal RBC population



       distribution.

 

 PLATELET COUNT (BEAKER) (test  226 K/CU MM  150-450  



 qleo=516)      

 

 MEAN PLATELET VOLUME (BEAKER)  11.6 fL  9.4-12.3  



 (test code=754)      

 

 NUCLEATED RED BLOOD CELLS  0 /100 WBC  0-0  



 (BEAKER) (test code=413)      

 

 NEUTROPHILS RELATIVE PERCENT  88 %    



 (BEAKER) (test code=429)      

 

 LYMPHOCYTES RELATIVE PERCENT  8 %    



 (BEAKER) (test code=430)      

 

 MONOCYTES RELATIVE PERCENT  3 %    



 (BEAKER) (test code=431)      

 

 EOSINOPHILS RELATIVE PERCENT  0 %    



 (BEAKER) (test code=432)      

 

 BASOPHILS RELATIVE PERCENT  0 %    



 (BEAKER) (test code=437)      

 

 NEUTROPHILS ABSOLUTE COUNT  8.65 K/ L  1.56-6.13  



 (BEAKER) (test code=670)      

 

 LYMPHOCYTES ABSOLUTE COUNT  0.78 K/ L  1.18-3.74  



 (BEAKER) (test code=414)      

 

 MONOCYTES ABSOLUTE COUNT  0.31 K/ L  0.24-0.36  



 (BEAKER) (test code=415)      

 

 EOSINOPHILS ABSOLUTE COUNT  0.00 K/ L  0.04-0.36  



 (BEAKER) (test code=416)      

 

 BASOPHILS ABSOLUTE COUNT  0.02 K/ L  0.01-0.08  



 (BEAKER) (test code=417)      

 

 IMMATURE GRANULOCYTES-RELATIVE  1 %  0-1  



 PERCENT (BEAKER) (test      



 code=2801)      



CARCINOEMBRYONIC ANTIGEN (CEA)2018-10-15 06:17:00





 Test Item  Value  Reference Range  Comments

 

 CARCINOEMBRYONIC ANTIGEN (BEAKER) (test code=685)  1.6 ng/mL  0.0-5.0  



BASIC METABOLIC PANEL2018-10-15 05:58:00





 Test Item  Value  Reference Range  Comments

 

 SODIUM (BEAKER) (test  142 meq/L  136-145  



 uhem=702)      

 

 POTASSIUM (BEAKER) (test  3.7 meq/L  3.5-5.1  



 code=379)      

 

 CHLORIDE (BEAKER) (test  107 meq/L    



 code=382)      

 

 CO2 (BEAKER) (test  25 meq/L  22-29  



 code=355)      

 

 BLOOD UREA NITROGEN  16 mg/dL  7-21  



 (BEAKER) (test code=354)      

 

 CREATININE (BEAKER) (test  0.72 mg/dL  0.57-1.25  



 code=358)      

 

 GLUCOSE RANDOM (BEAKER)  118 mg/dL    



 (test code=652)      

 

 CALCIUM (BEAKER) (test  9.0 mg/dL  8.4-10.2  



 code=697)      

 

 EGFR (BEAKER) (test  82 mL/min/1.73 sq m    ESTIMATED GFR IS NOT AS



 code=1092)      ACCURATE AS CREATININE



       CLEARANCE IN PREDICTING



       GLOMERULAR FILTRATION



       RATE. ESTIMATED GFR IS



       NOT APPLICABLE FOR



       DIALYSIS PATIENTS.



PT/APTT2018-10-15 05:40:00





 Test Item  Value  Reference Range  Comments

 

 PROTIME (BEAKER) (test code=759)  14.1 seconds  11.7-14.7  

 

 INR (BEAKER) (test code=370)  1.1  <=5.9  

 

 PARTIAL THROMBOPLASTIN TIME (BEAKER) (test  25.6 seconds  22.5-36.0  



 code=760)      



RECOMMENDED COUMADIN/WARFARIN INR THERAPY RANGESSTANDARD DOSE: 2.0 - 3.0   
Includes: PROPHYLAXIS forvenous thrombosis, systemic embolization; TREATMENT 
for venous thrombosis and/or pulmonary embolus.HIGH RISK: Target INR is 2.5-3.5 
for patients with mechanical heart valves.PROTHROMBIN TIME/INR2018-10-15 05:39:
00





 Test Item  Value  Reference Range  Comments

 

 PROTIME (BEAKER) (test code=759)  14.1 seconds  11.7-14.7  

 

 INR (BEAKER) (test code=370)  1.1  <=5.9  



RECOMMENDED COUMADIN/WARFARIN INR THERAPY RANGESSTANDARD DOSE: 2.0 - 3.0   
Includes: PROPHYLAXIS forvenous thrombosis, systemic embolization; TREATMENT 
for venous thrombosis and/or pulmonary embolus.HIGH RISK: Target INR is 2.5-3.5 
for patients with mechanical heart valves.POCT-GLUCOSE METER2018-10-14 21:36:00





 Test Item  Value  Reference Range  Comments

 

 POC-GLUCOSE METER (BEAKER)  164 mg/dL    TESTED AT [address]



 (test kftt=6881)      [facility] [address]



POCT-GLUCOSE METER2018-10-14 17:15:00





 Test Item  Value  Reference Range  Comments

 

 POC-GLUCOSE METER (BEAKER)  142 mg/dL    TESTED AT [address]



 (test vfqu=7862)      [facility] [address]



POCT-GLUCOSE [id number] 12:18:00





 Test Item  Value  Reference Range  Comments

 

 POC-GLUCOSE METER (BEAKER)  103 mg/dL    TESTED AT [address]



 (test qvrr=5742)      [facility] [address]



POCT-GLUCOSE METER2018-10-14 08:14:00





 Test Item  Value  Reference Range  Comments

 

 POC-GLUCOSE METER (BEAKER)  144 mg/dL    TESTED AT [address]



 (test zfyg=0869)      [facility] [address]



POCT-GLUCOSE [id number] 23:40:00





 Test Item  Value  Reference Range  Comments

 

 POC-GLUCOSE METER (BEAKER)  153 mg/dL    TESTED AT [facility] [address]



 (test jehs=7009)      [facility] [address]



U/S, ENDOVAGINAL (EV)2018-10-13 23:24:00Reason for exam:-&gt;uterine massFINAL 
REPORT PATIENT ID:   [id number]  Pelvis ultrasound, transabdominal and 
transvaginal exams Clinical History: uterine mass Discussion: Sonographic 
evaluation of the pelvis was performed transabdominally and transvaginally. 
There is no prior study for direct comparison. Correlation is made with recent 
abdominal CT 10/12/2018. The uterus measures 12.9 x 8.7 x 9.4 cm. The 
myometrium is heterogeneous. There is a heterogeneously hypoechoic mass in the 
uterus measuring 11.4 x 8.3 x 8.4 cm. The endometrium is obscured and not 
visualized. There is complex fluid distending the lower endometrial and 
endocervical canals, which may represent blood/blood products. The cervix is 
long and closed. The left adnexa is enlarged for patient age measuring 6.3 x 
3.7 x 4.7 cm, which contains soft tissue not typical for ovarian parenchyma, 
with a 3.2 x 3 x 2.8 cm cystic component. The right ovary was not visualized 
due to obscuration by bowel gas. There is no pelvic free fluid. Impression: 
11.4 x 8.3 x 8.4 cm uterine mass. Differential diagnosis is endometrial 
carcinoma versus fibroid.  Enlarged left adnexa for patient age containing 
solid and cystic components for which ovarian malignancy cannot be excluded. 
GYN evaluation is recommended. A pelvic MRI would be helpful for further 
evaluation and may be obtained as clinically warranted. Complex fluid mildly 
distending the lower endometrial and endocervical cavities may represent blood/
blood products. Clinical correlation is recommended. No pelvic free fluid. 
Signed: Lynn Urban MDReport Verified Date/Time:  10/13/2018 23:24:40 Reading 
Location: [facility] C013Y CT Body Reading Room    Electronically signed by: LYNN URBAN MD on 10/13/2018 11:24 PMU/S, PELVIS2018-10-13 23:24:00Reason 
for exam:-&gt;uterine massFINAL REPORT PATIENT ID:   [id number]  Pelvis ultrasound
, transabdominal and transvaginal exams Clinical History: uterine mass 
Discussion: Sonographic evaluation of the pelvis was performed transabdominally 
and transvaginally. There is no prior study for direct comparison. Correlation 
is made with recent abdominal CT 10/12/2018. The uterus measures 12.9 x 8.7 x 
9.4 cm. The myometrium is heterogeneous. There is a heterogeneously hypoechoic 
mass in the uterus measuring 11.4 x 8.3 x 8.4 cm. The endometrium is obscured 
and not visualized. There is complex fluid distending the lower endometrial and 
endocervical canals, which may represent blood/blood products. The cervix is 
long and closed. The left adnexa is enlarged for patient age measuring 6.3 x 
3.7 x 4.7 cm, which contains soft tissue not typical for ovarian parenchyma, 
with a 3.2 x 3 x 2.8 cm cystic component. The right ovary was not visualized 
due to obscuration by bowel gas. There is no pelvic free fluid. Impression: 
11.4 x 8.3 x 8.4 cm uterine mass. Differential diagnosis is endometrial 
carcinoma versus fibroid.  Enlarged left adnexa for patient age containing 
solid and cystic components for which ovarian malignancy cannot be excluded. 
GYN evaluation is recommended. A pelvic MRI would be helpful for further 
evaluation and may be obtained as clinically warranted. Complex fluid mildly 
distending the lower endometrial and endocervical cavities may represent blood/
blood products. Clinical correlation is recommended. No pelvic free fluid. 
Signed: Lynn Urban MDReport Verified Date/Time:  10/13/2018 23:24:40 Reading 
Location: [facility] C013Y CT Body Reading Room    Electronically signed by: LYNN URBAN MD on 10/13/2018 11:24 PMPOCT-GLUCOSE METER2018-10-13 17:04:00





 Test Item  Value  Reference Range  Comments

 

 POC-GLUCOSE METER (BEAKER)  123 mg/dL    TESTED AT [address]



 (test uhvv=9907)      [facility] [address]



POCT-GLUCOSE [id number] 11:40:00





 Test Item  Value  Reference Range  Comments

 

 POC-GLUCOSE METER (BEAKER)  211 mg/dL    TESTED AT [address]



 (test uzcl=7335)      [facility] [address]



POCT-GLUCOSE METER2018-10-12 22:02:00





 Test Item  Value  Reference Range  Comments

 

 POC-GLUCOSE METER (BEAKER)  127 mg/dL    TESTED AT [facility] [address]



 (test nnib=7763)      [facility] [address]



MR, BRAIN, [id number] 19:45:00Stealth protocolFINAL REPORT PATIENT ID:   
[id number] MRI Brain with and without contrast Stealth protocol 10/12/2018 7:41 
PM CLINICAL HISTORY: L frontal mass TECHNIQUE: Multiplanar, multisequence MR 
imaging of the brain was performed, utilizing the following imaging sequences: 
Axial T1, T2, FLAIR, GRE, DWI/ADC; sagittal T1; postcontrast axial, sagittal, 
and coronal T1. Thin section axial T2 FLAIR and pre and postcontrast T1 3-D FFE 
imaging sequences were obtained for intraoperative neuronavigation purposes. 
COMPARISON: None available. FINDINGS: There is a centrally necrotic tumor with 
internal hemorrhage and substantial surrounding edema in the left frontal lobe 
measuring 4.8 cm AP by 4.1 cm transverse by 4.5 cm craniocaudal. Mass effect 
results in 9 mm midline shift, measured at the septi pellucidi, with subfalcine 
herniation of the paramedian left frontal lobe. There is no ventricular 
entrapment. No additionallesions are evident. There is no infarct, hematoma, 
hydrocephalus, or extra-axial collection. Normalappearing flow-voids are 
present in the major intracranial vascular structures. The sellar and pineal 
regions, craniocervical junction, orbits, face, and skull base are without 
worrisome finding. 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 
glioblastoma multiforme with associated moderate mass effect. Signed: Seipel, Timothy MDReport Verified Date/Time:  10/12/2018 19:45:19 Reading Location: [facility] Radiology Reading Room    Electronically signed by: TIMOTHY J SEIPEL
, M.D. on 10/12/2018 07:45 PMEEG AWAKE AND DROWSY2018-10-12 10:10:00Reason for 
exam:-&gt;L frontal mass, c/f seizuresShould this be performed at the bedside?-&
gt;YesDATE OF TEST: [id number] OF REPORT: [id number]: [id number]: 18-
1934Start time: 08:32Stop time: 08:52ICD-10: R 56.9CPT Code: 85453GSHASZR: 64 yo female with confusion with decline in memory and frontal mass.MEDICATIONS: 
morphine, decadronTECHNICAL SUMMARY: This is a digital video EEG recorded with 
32 input channels reviewed with bipolar and referential montages using the 
modified combinatorialsystem nomenclature. DESCRIPTION OF RECORD: During the 
maximally alert state, a 9.5 Hz posterior dominant rhythm was seen that was 
symmetric and reactive. Intermittent 1-2 Hz delta slowing was seen over the 
left hemisphere, maximal left centroparietal region. More anteriorly, low 
voltage frontocentralbeta predominated. Drowsiness was characterized by alpha 
attenuation and increased frontocentral theta. The delta slowing over the left 
hemisphere was more prominent during drowsiness. Stage 2 sleep architecture was 
seen including K-complexes and sleep spindles.HV: Hyperventilation was not 
performed. PHOTIC STIMULATION: Photic stimulation was not performed.VIDEO 
EVENTS RECORDED: noneELECTROCARDIOGRAMEVENTS: none IMPRESSION: Abnormal awake 
and sleep EEGIntermittent slow, lateralized left hemisphere CLINICAL CORRELATION
: This EEG is consistent with focal dysfunction over the left hemisphere. An 
EEG without epileptiform discharges does not exclude the possibility of 
epilepsy. If the clinical suspicion of epilepsy remains, consider additional 
EEG recordings.  Stalin Horner MDNeurophysiology Fellow Eder Ribeiro MD, 
MSClinical Neurophysiology/Epilepsy Attending     Electronically signed by: EDER RIBEIRO MD on 10/12/2018 10:10 AMCT, CHEST, WITH CONTRAST2018-10-12 07:54
:00FINAL REPORT PATIENT ID:   [id number] CT scan of the chest, abdomen and 
pelvis. Clinical history: Neoplasm of neck, metastatic, unknown. COMPARISON 
STUDY: None available. TECHNIQUE: Contiguous helical slices were acquired 
through the chest, abdomen and pelvis post administration of intravenous 
contrast.No oral contrast was administered. This exam was performed according 
to our department dose optimization program which includes automated exposure 
control, adjustment of the mA and/or kV according to the patient's size and/or 
use of iterative reconstruction technique. FINDINGS: The mediastinum 
demonstrates no suspicious masses or adenopathy. There are no pleural 
effusions. The tracheobronchial tree is clear with no endobronchial lesions. 
The pulmonary parenchyma demonstrates multiple small nodules suspicious for 
metastatic disease. These include a 5 mm nodule in the left upper lobe on image 
15, 8 mm nodule in the left lower lobe on image 23, 5 mm nodule in the right 
upper lobe on image 14, and 4 mm nodule in the left lower lobe on image 18. The 
liver, spleen, pancreas, and kidneys are unremarkable. A 2.1 x 2.0 cm cyst is 
seen in the upper pole of the left kidney. The gallbladder and biliary treeare 
unremarkable. Postsurgical changes are seen related to a gastric sleeve. There 
are no dilated loops of bowel seen to suggest obstruction. Within the pelvis is 
a 13.8 x 9.9 cm mass emanating from the uterus highly suspicious for uterine 
carcinoma. It extends towards the left adnexal region. Within the left adnexa 
is a 3.4 x 3.3 cm cyst, likely ovarian in nature. No free fluid or free air is 
seen. The aorta is normal in caliber. Atherosclerosis is seen. No 
retroperitoneal adenopathy is seen. Bone windows demonstrate degenerative 
changes with a levorotoscoliosis. IMPRESSION:1. Large uterine mass extending to 
the left adnexa likely involving the left ovary. Endometrial carcinoma would be 
highest in the differential.2. Multiple pulmonary nodules suspicious for 
metastatic disease.3. Status post gastric sleeve. Signed: Von Cannon 
MDReport Verified Date/Time:  10/12/2018 07:54:27 Reading Location: [facility] 
Diagnostic Imaging Reading Room - [place] [address]      Electronically signed by: 
VON CANNON M.D. on 10/12/2018 07:54 [place], ABDOMEN2018-10-12 07:54:00FINAL 
REPORT PATIENT ID:   [id number] CT scan of the chest, abdomen and pelvis. 
Clinical history: Neoplasm of neck, metastatic, unknown. COMPARISON STUDY: None 
available. TECHNIQUE: Contiguous helical slices were acquired through the chest
, abdomen and pelvis post administration of intravenous contrast.No oral 
contrast was administered. This exam was performed according to our department 
dose optimization program which includes automated exposure control, adjustment 
of the mA and/or kV according to the patient's size and/or use of iterative 
reconstruction technique. FINDINGS: The mediastinum demonstrates no suspicious 
masses or adenopathy. There are no pleural effusions. The tracheobronchial tree 
is clear with no endobronchial lesions. The pulmonary parenchyma demonstrates 
multiple small nodules suspicious for metastatic disease. These include a 5 mm 
nodule in the left upper lobe on image 15, 8 mm nodule in the left lower lobe 
on image 23, 5 mm nodule in the right upper lobe on image 14, and 4 mm nodule 
in the left lower lobe on image 18. The liver, spleen, pancreas, and kidneys 
are unremarkable. A 2.1 x 2.0 cm cyst is seen in the upper pole of the left 
kidney. The gallbladder and biliary treeare unremarkable. Postsurgical changes 
are seen related to a gastric sleeve. There are no dilated loops of bowel seen 
to suggest obstruction. Within the pelvis is a 13.8 x 9.9 cm mass emanating 
from the uterus highly suspicious for uterine carcinoma. It extends towards the 
left adnexal region. Within the left adnexa is a 3.4 x 3.3 cm cyst, likely 
ovarian in nature. No free fluid or free air is seen. The aorta is normal in 
caliber. Atherosclerosis is seen. No retroperitoneal adenopathy is seen. Bone 
windows demonstrate degenerative changes with a levorotoscoliosis. IMPRESSION:
1. Large uterine mass extending to the left adnexa likely involving the left 
ovary. Endometrial carcinoma would be highest in the differential.2. Multiple 
pulmonary nodules suspicious for metastatic disease.3. Status post gastric 
sleeve. Signed: Von Cannon MDReport Verified Date/Time:  10/12/2018 07:54:
27 Reading Location: [facility] Diagnostic Imaging Reading Room - [place] [address]      
Electronically signed by: VON CANNON M.D. on 10/12/2018 07:54 
[id number] 04:33:00





 Test Item  Value  Reference Range  Comments

 

 PHOSPHORUS (BEAKER) (test code=604)  3.6 mg/dL  2.3-4.7  



MAGNESIUM2018-10-12 04:33:00





 Test Item  Value  Reference Range  Comments

 

 MAGNESIUM (BEAKER) (test code=627)  2.2 mg/dL  1.6-2.6  



BASIC METABOLIC PANEL2018-10-12 04:33:00





 Test Item  Value  Reference Range  Comments

 

 SODIUM (BEAKER) (test  142 meq/L  136-145  



 jklw=758)      

 

 POTASSIUM (BEAKER) (test  3.9 meq/L  3.5-5.1  



 code=379)      

 

 CHLORIDE (BEAKER) (test  108 meq/L    



 code=382)      

 

 CO2 (BEAKER) (test  23 meq/L  22-29  



 code=355)      

 

 BLOOD UREA NITROGEN  12 mg/dL  7-21  



 (BEAKER) (test code=354)      

 

 CREATININE (BEAKER) (test  0.72 mg/dL  0.57-1.25  



 code=358)      

 

 GLUCOSE RANDOM (BEAKER)  139 mg/dL    



 (test code=652)      

 

 CALCIUM (BEAKER) (test  10.1 mg/dL  8.4-10.2  



 code=697)      

 

 EGFR (BEAKER) (test  82 mL/min/1.73 sq m    ESTIMATED GFR IS NOT AS



 code=1092)      ACCURATE AS CREATININE



       CLEARANCE IN PREDICTING



       GLOMERULAR FILTRATION



       RATE. ESTIMATED GFR IS



       NOT APPLICABLE FOR



       DIALYSIS PATIENTS.



PROTHROMBIN TIME/INR2018-10-12 04:14:00





 Test Item  Value  Reference Range  Comments

 

 PROTIME (BEAKER) (test code=759)  14.5 seconds  11.7-14.7  

 

 INR (BEAKER) (test code=370)  1.1  <=5.9  



RECOMMENDED COUMADIN/WARFARIN INR THERAPY RANGESSTANDARD DOSE: 2.0 - 3.0   
Includes: PROPHYLAXIS forvenous thrombosis, systemic embolization; TREATMENT 
for venous thrombosis and/or pulmonary embolus.HIGH RISK: Target INR is 2.5-3.5 
for patients with mechanical heart valves.APTT2018-10-12 04:14:00





 Test Item  Value  Reference Range  Comments

 

 PARTIAL THROMBOPLASTIN TIME (BEAKER) (test  29.1 seconds  22.5-36.0  



 code=760)      



CBC W/PLT COUNT &amp; AUTO DIFFERENTIAL2018-10-12 04:11:00





 Test Item  Value  Reference Range  Comments

 

 WHITE BLOOD CELL COUNT (BEAKER) (test code=775)  5.7 K/ L  3.5-10.5  

 

 RED BLOOD CELL COUNT (BEAKER) (test code=761)  4.95 M/ L  3.93-5.22  

 

 HEMOGLOBIN (BEAKER) (test code=410)  10.7 GM/DL  11.2-15.7  

 

 HEMATOCRIT (BEAKER) (test code=411)  36.0 %  34.1-44.9  

 

 MEAN CORPUSCULAR VOLUME (BEAKER) (test code=753)  72.7 fL  79.4-94.8  

 

 MEAN CORPUSCULAR HEMOGLOBIN (BEAKER) (test  21.6 pg  25.6-32.2  



 gytc=832)      

 

 MEAN CORPUSCULAR HEMOGLOBIN CONC (BEAKER) (test  29.7 GM/DL  32.2-35.5  



 code=752)      

 

 RED CELL DISTRIBUTION WIDTH (BEAKER) (test  24.4 %  11.7-14.4  



 code=412)      

 

 PLATELET COUNT (BEAKER) (test code=756)  230 K/CU MM  150-450  

 

 MEAN PLATELET VOLUME (BEAKER) (test code=754)  10.3 fL  9.4-12.3  

 

 NUCLEATED RED BLOOD CELLS (BEAKER) (test  0 /100 WBC  0-0  



 code=413)      

 

 NEUTROPHILS RELATIVE PERCENT (BEAKER) (test  88 %    



 code=429)      

 

 LYMPHOCYTES RELATIVE PERCENT (BEAKER) (test  11 %    



 code=430)      

 

 MONOCYTES RELATIVE PERCENT (BEAKER) (test  1 %    



 code=431)      

 

 EOSINOPHILS RELATIVE PERCENT (BEAKER) (test  0 %    



 code=432)      

 

 BASOPHILS RELATIVE PERCENT (BEAKER) (test  0 %    



 code=437)      

 

 NEUTROPHILS ABSOLUTE COUNT (BEAKER) (test  4.98 K/ L  1.56-6.13  



 code=670)      

 

 LYMPHOCYTES ABSOLUTE COUNT (BEAKER) (test  0.62 K/ L  1.18-3.74  



 code=414)      

 

 MONOCYTES ABSOLUTE COUNT (BEAKER) (test  0.04 K/ L  0.24-0.36  



 code=415)      

 

 EOSINOPHILS ABSOLUTE COUNT (BEAKER) (test  0.00 K/ L  0.04-0.36  



 code=416)      

 

 BASOPHILS ABSOLUTE COUNT (BEAKER) (test  0.02 K/ L  0.01-0.08  



 code=417)      

 

 IMMATURE GRANULOCYTES-RELATIVE PERCENT (BEAKER)  1 %  0-1  



 (test code=2801)      



BASIC METABOLIC PANEL2018-10- 23:45:00





 Test Item  Value  Reference Range  Comments

 

 SODIUM (BEAKER) (test  142 meq/L  136-145  



 xdtm=179)      

 

 POTASSIUM (BEAKER) (test  3.8 meq/L  3.5-5.1  



 code=379)      

 

 CHLORIDE (BEAKER) (test  108 meq/L    



 code=382)      

 

 CO2 (BEAKER) (test  21 meq/L  22-29  



 code=355)      

 

 BLOOD UREA NITROGEN  13 mg/dL  7-21  



 (BEAKER) (test code=354)      

 

 CREATININE (BEAKER) (test  0.74 mg/dL  0.57-1.25  



 code=358)      

 

 GLUCOSE RANDOM (BEAKER)  145 mg/dL    



 (test code=652)      

 

 CALCIUM (BEAKER) (test  9.6 mg/dL  8.4-10.2  



 code=697)      

 

 EGFR (BEAKER) (test  79 mL/min/1.73 sq m    ESTIMATED GFR IS NOT AS



 code=1092)      ACCURATE AS CREATININE



       CLEARANCE IN PREDICTING



       GLOMERULAR FILTRATION



       RATE. ESTIMATED GFR IS



       NOT APPLICABLE FOR



       DIALYSIS PATIENTS.
Summary of Care

 Created on:2020



Patient:Closs, Mary Landry

Sex:Female

:1955

External Reference #:[id number]





Demographics







 Address  [address]



   [place] [address]

 

 Home Phone  [phone]

 

 Mobile Phone  [phone]

 

 Phone  [phone]

 

 Email Address  [email or web address]

 

 Preferred Language  English

 

 Marital Status  Single

 

 Islam Affiliation  Unknown

 

 Race  White

 

 Ethnic Group  Not  or 









Author







 Organization  [facility]

 

 [place] [address]









Support







 Name  Relationship  Address  Phone

 

 Chloe Allen  Sister  Unavailable  [phone]

 

 Melvin Ponce  Unavailable  [address]  [phone]



     [place] [address]  









Care Team Providers







 Name  Role  Phone

 

 Unavailable  Primary Care Provider  Unavailable









Reason for Visit







 Reason  Comments

 

 Other  Neuro Consult







Encounter Details







 Date  Type  Department  Care Team  Description

 

 2020  Office Visit  [facility]  Marlon Gutierrez (Neuro 
Consult



     Medicine Neurology



  MD Felipe



  )



     [address]



  [address]



  



     [address] Floor, [address]



  [address]



  



     [place] [address]



  



     [address]



  [phone] (Fax)  







Allergies

No Known Allergiesdocumented as of this encounter (statuses as of 2020)



Medications







 Medication  Sig  Dispensed  Refills  Start Date  End Date  Status

 

 Lansoprazole  Take  by    0      Active



 (PREVACID OR)  mouth.          

 

 cyanocobalamin 500  Take 1 Tab  30 Tab  12  2020    Active



 MCG tablet  by mouth          



   daily.          

 

 Fe Cbn-Fe  Take 1  30 Each  3  2019  Discontinued



 Gluc-FA-B12-C-DSS  tablet by        0  (*Therapy



 (FERRALET 90) 90-1  mouth          completed)



 MG TABS  daily.          



documented as of this encounter (statuses as of 2020)



Active Problems







 Problem  Noted Date

 

 Endometrial cancer (HCCode)  2018



documented as of this encounter (statuses as of 2020)



Social History







 Tobacco Use  Types  Packs/Day  Years Used  Date

 

 Never Smoker        









 Smokeless Tobacco: Never Used      









 Alcohol Use  Drinks/Week  oz/Week  Comments

 

 No      









 Alcohol Habits  Answer  Date Recorded

 

 How often do you have a drink containing alcohol?  Never  2018

 

 How many drinks containing alcohol do you have on a typical  Not asked  



 day when you are drinking?    

 

 How often do you have six or more drinks on one occasion?  Not asked  









 Sex Assigned at Birth  Date Recorded

 

 Not on file  









 Job Start Date  Occupation  Industry

 

 Not on file  Not on file  Not on file









 Travel History  Travel Start  Travel End









 No recent travel history available.



documented as of this encounter



Last Filed Vital Signs







 Vital Sign  Reading  Time Taken  Comments

 

 Blood Pressure  119/77  2020 10:53 AM CST  

 

 Pulse  90  2020 10:53 AM CST  

 

 Temperature  -  -  

 

 Respiratory Rate  -  -  

 

 Oxygen Saturation  -  -  

 

 Inhaled Oxygen Concentration  -  -  

 

 Weight  100.2 kg (221 lb)  2020 10:53 AM CST  

 

 Height  154.9 cm (5' 1")  2020 10:53 AM CST  

 

 Body Mass Index  41.76  2020 10:53 AM CST  



documented in this encounter



Patient Instructions

Patient InstructionsMarlon Gutierrez MD - 2020 11:00 AM CSTMary LANDRY Closs is a 64 y.o., female with with PMH of stage IV-B high-grade 
endometrioid endometrial adenocarcinoma  diagnosed 10/2018 with uterine mass 
and brain mass, s/p L craniotomy for resectionof brain mass 10/15/2018, SRS -18 (27Gy in 3 fxn), Carbo/Paclitaxel x 7 cycles stoppedafter 2019 
due to persistent thrombocytopenia s/p resection of new enhancement in the 
brain on 20. Most recent MRI Brain 20 shows resection of previous 
enhancement in left frontal lobe withsome residual edema and small amount of 
new enhancement around surgical cavity Pathology consistent with radiation 
treatment effects with no viable tumor cells seen. Patient doing well 
clinically. Discussed options of starting avastin 7.5 mg/kg every 3 weeks for 4 
doses or continuing to monitor on surveillance with new MRI Brain in 2-3 
months. Patient would like to hold off for now as is planning cataract surgery.

Electronically signed by Marlon Gutierrez MD at 2020 11:33 AM CST

documented in this encounter



Progress Notes

Marlon Gutierrez MD - 2020 11:00 AM CSTFormatting of this note might 
be different from the original.

CC:

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



HPI:

Mary LANDRY Closs is a 64 y.o., female with with PMH of stage IV-B high-grade 
endometrioid endometrial adenocarcinoma  diagnosed 10/2018 with uterine mass 
and brain mass, s/p L craniotomy for resectionof brain mass 10/15/2018, SRS -18 (27Gy in 3 fxn), Carbo/Paclitaxel x 7 cycles stoppedafter 2019 
due to persistent thrombocytopenia s/p resection of new enhancement in the 
brain on 20.



Patient presented with several weeks of short term memory issues. Patient also 
began having trouble with her speech with difficulty getting words out. Most 
recent MRI Brain 20 shows interval development of a large area of masslike 
enhancement along the medial resection margin in the left frontal lobe, with 
extensive surrounding vasogenic edema involving the majority of the left 
frontal lobe and extending across the corpus callosum. Sulcal effacement 
throughout the left cerebral hemisphere and anew 7 mm rightward midline 
shift.Family reports some improvement in speech and memory since startingthe 
high dose IV steroids.



Treatment History:

1.L craniotomy for resection of brain mass 10/15/2018

2.SRS 18-18 (27Gy in 3 fxn

3. resection of new enhancement in the brain on 20 consistent with 
radiation induced brain necrosis



Interim History:

Patient was last seen in the hospital on 20. Patient reports that she has 
been doing well sinceher surgery on 20. She has not had any new 
neurological deficits since her hospitalization..Patient denies any visual 
changes, nausea/vomiting, focal weakness, numbness or tingling,  bowel or 
bladder issues, ataxia, and seizures.





Past Medical History:

Diagnosis Date

 Cervical cancer (HCCode)

 Endometrial cancer (HCCode)

 UNREMARKABLE



Past Surgical History:

Procedure Laterality Date

 HX  SECTION

 HX CRANIOTOMY

 HX LAPAROSCOPIC SLEEVE GASTRECTOMY

 HX TONSILLECTOMY



Current Outpatient Medications on File Prior to Visit

Medication Sig Dispense Refill

 cyanocobalamin 500 MCG tablet Take 1 Tab by mouth daily. 30 Tab 12

 Lansoprazole (PREVACID OR) Take  by mouth.



No current facility-administered medications on file prior to visit.



No Known Allergies

Social History

  Socioeconomic History

    Marital status: Single

    Spouse name: Not on file

    Number of children: 2

    Years of education: Not on file

    Highest education level: Not on file

  Occupational History

    Occupation: Patient 

  Social Needs

    Financial resource strain: Not on file

    Food insecurity:

      Worry: Not on file

      Inability: Not on file

    Transportation needs:

      Medical: Not on file

      Non-medical: Not on file

  Tobacco Use

    Smoking status: Never Smoker

    Smokeless tobacco: Never Used

  Substance and Sexual Activity

    Alcohol use: No

      Frequency: Never

    Drug use: No

    Sexual activity: Not on file

  Lifestyle

    Physical activity:

      Days per week: Not on file

      Minutes per session: Not on file

    Stress: Not on file

  Relationships

    Social connections:

      Talks on phone: Not on file

      Gets together: Not on file

      Attends Jain service: Not on file

      Active member of club or organization: Not on file

      Attends meetings of clubs or organizations: Not on file

      Relationship status: Not on file

    Intimate partner violence:

      Fear of current or ex partner: Not on file

      Emotionally abused: Not on file

      Physically abused: Not on file

      Forced sexual activity: Not on file

  Other Topics

    Concerns:

      Not on file

  Social History Narrative

    Not on file



Family History

Problem Relation Name Age of Onset

 Unremarkable Mother

 Dementia Mother

 Other  (Congestive Heart Failure) Mother

 Unremarkable Father

 Heart Problems Father

 Heart Failure Sister

 Other  (Juvenile Diabetes) Sister



Review of Systems: 12 point ros negative except above interim history



PHYSICAL EXAM

/77 (BP Location: left arm, Patient Position: Sitting, Cuff Size: large)  
| Pulse 90  | Ht 5' 1" (1.549 m)  | Wt 221 lb (100.2 kg)  | BMI 41.76 kg/m

KPS 90

General appearance: well-nourished, well-developed, appears stated age, sitting 
comfortably. Generalexamination: Head examination: post surgical changes.. 
Extremities: no pedal edema. Peripheral pulses are intact.

COGNITION: Patient is alert, oriented times three. Attention is normal. The 
fund of knowledge is normal. Language is fluent, naming intact, no errors. 
Memory  registration and recall intact.

CRANIAL NERVES EXAM

CN II: visual fields  full in four quadrants to gross confrontation;

CNs III, IV and VI: Pupils round, equal and reactive to light; Eye movements 
are full and conjugate in all directions. Accommodation intact.

CN V: Facial sensation was intact bilaterally.

CN VII: Facial strength was normal and symmetrical bilaterally. Eye closure 
strong.

CN VIII: Hearing was intact to finger rub AU.

CN IX and X: Soft palate elevates symmetrically in the midline; gag reflex  
intact.

CN XI: Shoulder shrug of normal strength bilaterally. SCMs 5 MRC bilaterally.

CN XII: Tongue is midline, normal movement, no atrophy or fasciculations.

MOTOR EXAMINATION: bulk normal.

Muscle grades (Right/Left):

Upper extremities: shoulder abduction 5 / 5, elbow flexion 5 / 5, elbow 
extension 5 / 5, wrist extension 5 / 5 , wrist flexion 5 / 5,  5 / 5 , 
finger flexion 5 / 5 , finger extension 5/5.

Lower extremities: hip flexion 5 / 5, hip extension 5 / 5, hip adduction 5 / 5, 
knee extension 5 / 5, Knee flexion 5 / 5 ankle dorsiflexion 5 / 5, ankle 
plantarflexion 5 / 5.

Tone: Normal tone in the lower and upper extremities.

Abnormal movements / Other: None

DTRs (right/left): biceps - 2 /2 , triceps - 2 /2 , brachioradialis - 2 / 2 , 
patellar -2 / 2 , ankle - 2 / 2

Plantar responses - flexor bilaterally.

GAIT:

Gait was normal.

CEREBELLAR:

Truncal: sitting posture normal.

Stance: Normal

Tandem gait: Normal

Upper limbs: Finger to Nose - normal bilaterally, Marylou  Normal bilaterally

SENSORY EXAMINATION

Light touch: Intact in all four extremities, trunk

Pinprick/Pain: Intact in all four extremities, trunk

Romberg: Absent



Pathology:

Case Report



Case Report

Surgical Pathology Report             Case: [id number] 
               

Authorizing Provider: Alphonse Wagner, Collected:      2019 1016      

           MD               
                    

Ordering Location:   [facility]     Received:     
 2019 1243      

           SERVICES             
                   

Pathologist:      Camille Espinosa MD         
                

Specimens:  A) - Fallopian Tube, Left &amp; Ovary, left tube and ovary   
            

      B) - Uterus w/Right Fallopian Tube &amp; Ovary     
                

      C) - Cervix, additional anterior cervix       
                

      D) - Lymph Node, right pelvic lymph node       
               

      E) - Lymph Node, left pelvic lymph node       
                



DIAGNOSIS

A. TUBE AND OVARY, LEFT, SALPINGO-OOPHORECTOMY:

        OVARY

            - METASTATIC HIGH GRADE CARCINOMA

        TUBE

            - NO TUMOR PRESENT



B. UTERUS, CERVIX, RIGHT TUBE AND OVARY, HYSTERECTOMY AND RIGHT SALPINGO-
OOPHORECTOMY:

        UTERUS

               - MIXED HIGH GRADE ENDOMETRIAL CARCINOMA, COMPOSED OF SEROUS 
CARCINOMA AND ENDOMETRIOID CARCINOMA, FIGO GRADE 3

               - TUMOR INVADES THROUGH THE MYOMETRIUM AND PENETRATES THE SEROSA

               - LYMPHOVASCULAR SPACE INVASION PRESENT

               - TUMOR INVOLVES THE LOWER UTERINE SEGMENT

        CERVIX

               - TUMOR INVOLVES ENDOCERVICAL GLANDS

        RIGHT TUBE

              - NO TUMOR PRESENT

        RIGHT OVARY

             - NO TUMOR PRESENT

             - ENDOSALPINGIOSIS



C. ADDITIONAL ANTERIOR CERVIX, EXCISION:

           - FOCALLY ULCERATED SQUAMOUS MUCOSA, NO TUMOR PRESENT



D. LYMPH NODES, RIGHT PELVIC, DISSECTION:

           - FOUR LYMPH NODES, NO TUMOR PRESENT (0/4)



E. LYMPH NODES, LEFT PELVIC, DISSECTION:

           - FOUR LYMPH NODES, NO TUMOR PRESENT (0/4)



                                                             Signing 
Pathologist Direct Phone Line: [phone]

Electronically signed by Camille Espinosa MD on 2019 at 1210

COMMENT

The tumor shows an admixture of serous and endometrioid carcinoma. The ovarian 
metastasis is predominantly serous carcinoma.

Immunohistochemical stains performed on tumor in uterus and ovary show the 
following results:

Uterus

WT-1- negative

ER- diffusely positive

p16 - patchy staining

p53 - variable staining, with some foci of wild type and other foci of diffuse 
staining



Ovary

WT-1- negative

ER- diffusely positive

p16 - diffuse staining

p53 - diffuse staining







CPT Code(s)

88309 X 1

88307 X 3

88305 X 1

88342 x 2

88341 x 6



CLINICAL HISTORY

Malignant neoplasm of uterus



SPECIMEN SOURCE

A. Left tube and ovary. B. Uterus with right fallopian tube and ovary. C. 
Additional anterior cervix. D. Right pelvic lymph node. E. Left pelvic lymph 
node



GROSS DESCRIPTION

Part A. Labeled "left tube and ovary" consists of a 364 gm left salpingo-
oophorectomy with fallopiantube measuring 4 cm in length x 0.5 cm in diameter. 
The enlarged cystic ovary measures 14.5 x 12 x 3.5 cm and is received 
collapsed. The surface is primarily intact. It is inked and serially sectioned 
showing the entire ovary to be replaced by a tan, off-white, lobulated, firm 
mass with cystic changes. There is one unilocular cyst that was disrupted and 
measuring approximately 8 cm in greatest dimension. The locule has a smooth 
inner and outer lining.



Representative sections are submitted as follows: A1, A2, entire fallopian tube
; A3-A15, representative of mass; A16-A18, representative of cyst.



Part B. Labeled "u; with right fallopian tube and ovary" consists of a 151 gm 
hysterectomy with right fallopian tube and ovary. The right fallopian tube 
measures 5.5 cm in length x 0.4 cm in diameter. The right ovary measures 2 x 1 
x 0.5 cm. The fallopian tube and ovary are grossly unremarkable. The uterine 
corpus is irregular shaped and ragged and slightly nodular. The ectocervix 
measures 1.5 cm in greatest dimension. The external os is round measuring 0.5 
cm.



Ink code: Anterior uterus-blue and posterior-black.



Grossly the entire endometrial cavity is replaced by a tan, off-white, 
multinodular, glistening massmeasuring approximately 6 x 5.5 cm with a maximum 
depth of 2.5 cm. The mass abuts the anterior and posterior serosa and is 3.5 cm 
from the anterior ectocervix and 4.5 cm from the posterior ectocervix.



Section code: B1, B2, entire fallopian tube; B3, entire ovary; B4-B9, one 
contiguous section of anterior uterine wall from dome to ectocervix; B10-B15, 
additional one contiguous section of anterior wall from dome to ectocervix; B16-
B20, posterior uterine wall from dome to cervix; B21-B25, posterior uterine 
wall from dome to cervix.



Part C. Labeled "additional anterior cervix" consists of an irregular fragment 
of tan hemorrhagic soft tissue measuring 1.5 x 0.8 x 0.5 cm. The specimen is 
serially sectioned and submitted entirely in C1.



Part D. Labeled "right pelvic lymph node" consists of adipose tissue measuring 
2 x 1.5 x 1 cm yielding two red lymph nodes measuring 0.8 x 1 cm. The specimen 
is entirely submitted as follows: D1, two lymph nodes; D2, remainder of fat.



Part E. Labeled "left pelvic lymph node" consists of adipose tissue measuring 2 
x 1.5 x 1 cm in aggregate consisting of three tan lymph nodes ranging from 0.8 
to 1 cm. The specimen is entirely submitted as follows: E1, three lymph nodes; 
E2, remainder of fat. CG/ew



MICROSCOPIC DESCRIPTION

Performed.



CAP CANCER SYNOPTIC REPORT: Uterus

Specimen:  Uterine corpus, cervix, bilateral tubes and ovaries

Procedure:  Hysterectomy and bilateral salpingo-oophorectomy

Lymph Node Sampling:  Performed, bilateral pelvic

Tumor Site:  Anterior and posterior endomyometrium

Histological Type:  Mixed carcinoma, combined serous and endometrioid FIGO 
Grade 3

Depth of myometrial invasion: Tumo invades entire thickness and penetrates 
serosa

Involvement of Cervix:  Involved

Extensive Involvement of Other Organs:  Left ovarian metastasis

Lymph Vascular Invasion:  Present



PATHOLOGIC STAGING (TNM/FIGO): Stage IIIA

             Primary Tumor:  pT3a: Tumor invades through serosa and involves 
ovary

             Regional Lymph Nodes:  pN0:  No regional  lymph node metastasis

                          Number of Lymph Nodes Examined:  8

                          Number of Lymph Nodes Involved:  0

             Distant Metastasis (pM):  Not applicable



SPECIAL STUDIES

The interpretation of this case included the use of immunohistochemistry or 
special stains.



Immunohistochemistry technical testing was performed at [facility], Pathology Laboratory where it was developed and its performance 
characteristics were determined. It has not been cleared or approved by the 
U.S. Food and Drug Administration. The FDA has determined that such clearance 
or approval is not necessary. The test is used for clinical purposes. It should 
not be regarded as investigational or for research. This laboratory is 
certified under the Clinical Laboratory Improvement Amendments of 1988 (CLIA-88
) as qualified to perform high complexity clinical laboratory testing.





Case Report



Case Report

Surgical Pathology Report             Case: [id number] 
               

Authorizing Provider: Dennis Alvarez MD      Collected:     
 2020 1215      

Ordering Location:   [facility]     Received:     
 2020 1222      

           SERVICES             
                   

Pathologist:      Kalpseh Rod MD           
                 

Specimens:  A) - Soft Tissue, Other, left frontal lesion (frozen and permanent
)         

      B) - Soft Tissue, Other, left frontal lesion      
             



DIAGNOSIS

A. BRAIN, LEFT FRONTAL, CRANIOTOMY:

BLAND CEREBRAL PARENCHYMAL NECROSIS WITH SURROUNDING WHITE MATTER GLIOSIS, 
CONSISTENT WITH THERAPY CHANGES



B. BRAIN, LEFT FRONTAL, CRANIOTOMY:

BLAND CEREBRAL PARENCHYMAL NECROSIS WITH SURROUNDING WHITE MATTER GLIOSIS, 
CONSISTENT WITH THERAPY CHANGES

NO TUMOR PRESENT



                                                             Signing 
Pathologist Direct Phone Line: [phone]

Electronically signed by Kalpesh Rod MD on 2020 at 1543

COMMENT

Each of the two specimens were examined in their entirety. Large regions of 
necrosis, some vascular fibrinoid necrosis, and reactive gliosis are noted. No 
tumor is present in either of the two specimens. Immunoperoxidase stains for 
epithelial markers (AE1/3 and Cam 5.2), as well as PAX-8 are negative and do 
not stain in regions of necrosis.



CPT Code(s)

26121 z 2; 27853; 88341 x 2; 62234



CLINICAL HISTORY

History of prior brain metastases



SPECIMEN SOURCE

B. Left frontal lesion



GROSS DESCRIPTION

A. Received fresh labeled "left frontal lesion (frozen and permanent)" is a tan-
pink and granular soft tissue measuring in aggregate 3 x 2.5 x 0.5 cm. Touch 
preparations and frozen sections are performed. The specimen is submitted 
entirely in cassettes FSA1-A6. CYB/ew



B. Received fresh labeled with the patient's name, accession number and "left 
frontal lesion" is a 2.5 x 2 x 0.4 cm aggregate of multiple, irregular yellow-
gray, partially necrotic soft tissue fragments. The largest tissue is sectioned 
and the specimen is entirely submitted in B1.  CG/pl



INTRAOPERATIVE CONSULTATION

LEFT FRONTAL LESION, BIOPSY:

  - BRAIN WITH REACTIVE GLIOSIS AND NECROSIS WITH DYSTROPHIC CALCIFICATIONS



The results were verbally reported to Dr. Alvarez by Dr. Rod at 12:48 p.m.



MICROSCOPIC DESCRIPTION

Performed



SPECIAL STUDIES

The interpretation of this case included the use of immunohistochemistry or 
special stains.



Control Slides Examined:  In-house known positive controls were evaluated along 
with the test tissue.  These control slides run alongside of the patients 
sample show appropriate staining. Internal positive and negative controls when 
available are evaluated



Immunohistochemistry technical testing was performed at [facility], Pathology Laboratory where it was developed and its performance 
characteristics were determined. It has not been cleared or approved by the 
U.S. Food and Drug Administration. The FDA has determined that such clearance 
or approval is not necessary. The test is used for clinical purposes. It should 
not be regarded as investigational or for research. This laboratory is 
certified under the Clinical Laboratory Improvement Amendments of 1988 (CLIA-88
) as qualified to perform high complexity clinical laboratory testing.



Labs:

Lab Results

Component Value Date

 WBC 5.4 2020

 HGB 12.5 2020

 HCT 39.0 2020

 MCV 90.3 2020

  (L) 2020



Lab Results

Component Value Date

 CREATININE 0.88 2020

 BUN 17 2020

  2020

 K 4.1 2020

  2020

 CO2 28 2020



Imaging:

MRI BRAIN W WO CONTRAST

Narrative: FINAL REPORT



PATIENT ID:   [id number]



MRI Brain with and without contrast



Clinical History: Neoplasm: head, metastatic



Technique: MRI of the brain utilizing axial T1, T2, FLAIR, GRE, DWI,

sagittal T1; and postgadolinium axial, sagittal, and coronal

T1-weighted images.



Comparisons: MRI brain 2020, 2020, 10/7/2019



Findings:



A left frontal craniotomy is again seen with an underlying cystic

resection cavity which demonstrates increased, slightly thick,

marginal enhancement. Surrounding nonenhancing FLAIR signal

abnormality in the left frontal lobe has slightly decreased, as has

mass effect on the left frontal horn and rightward midline shift of

the anterior falx. Mild hemosiderin is again seen in the surgical

cavity.



There is no additional masslike enhancement elsewhere in the brain.

A small left anterior frontal extra-axial fluid collection is similar

appearing. There is no evidence for acute infarct or hemorrhage.

There is no hydrocephalus. There is no other significant white matter

disease. The major intracranial flow-voids appear patent.



Impression: IMPRESSION:



Since 2020, there is increased thickened enhancement along the

margins of the left frontal resection cavity, for which continued

attention on follow-up is recommended to evaluate for recurrent

neoplasm.



Signed: Chelsea Hancock MD

Report Verified Date/Time:  2020 12:50:03



Reading Location: [facility] C013 Neuro Reading Room

      Electronically signed by: CHELSEA HANCOCK M.D. on 2020 12:50 PM



Imaging was personally reviewed.



ASSESSMENT:

Mary LANDRY Closs is a 64 y.o., female with with PMH of stage IV-B high-grade 
endometrioid endometrial adenocarcinoma  diagnosed 10/2018 with uterine mass 
and brain mass, s/p L craniotomy for resectionof brain mass 10/15/2018, SRS -18 (27Gy in 3 fxn), Carbo/Paclitaxel x 7 cycles stoppedafter 2019 
due to persistent thrombocytopenia s/p resection of new enhancement in the 
brain on 20



PLAN:

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 
residual edema and small amount of new enhancement around surgical cavity 
Pathology consistent with radiation treatment effects with no viable tumor 
cells seen. Patient doing well clinically. Discussed options of starting 
avastin 7.5 mg/kg every 3 weeks for 4 doses or continuing to monitor on 
surveillance with new MRI Brain in 2-3 months. Patient would like to hold off 
for now as is planning cataract surgery. Patient is instructed to notify me 
immediately if her symptoms worsen or any new symptoms develop.



I discussed the plan with the patient. She expressed understanding and was in 
agreement with the above plan. All of her questions were addressed and answered.



Greater than 40 minutes were spent with the patient of which &gt;50% was spent 
discussing the diagnosis of Brain metastases and radiation induced brain 
necrosis,treatment options, potential side effects of treatment and prognosis.




Electronically signed by Marlon Gutierrez MD at 2020  9:39 PM 
CSTdocumented in this encounter



Plan of Treatment







 Date  Type  Specialty  Care Team  Description

 

 2020  Ancillary Procedure  Radiology    

 

 2020  Office Visit  Breast Care  Regine Cabrera MD



  



       [address] [facility]



  



       [address]th Floor, [address]



  



       [place], [place] [address] [phone] (Fax)  

 

 2020  Office Visit  Hematology and  Kenan Yeboah  



     Oncology  MD [facility]



  



       [address] [place], [place] [id number] [phone] (Fax)  









 Health Maintenance  Due Date  Last Done  Comments

 

 COLON CANCER SCREENING: COLONOSCOPY  1955    

 

 TETANUS SHOT (ADULT)  1969    

 

 BMI FOLLOW UP PLAN  1972    

 

 HEPATITIS C SCREENING  1972    

 

 FALL SCREEN  2019    

 

 OSTEOPOROSIS SCREENING  2019    

 

 PNEUMOVAX >=65 (PPSV23)  2019    

 

 PREVNAR >=65 (PCV13)  2019    

 

 FLU VACCINE > 6 MONTHS  2019    

 

 MAMMOGRAM ANNUAL  2019, 2018  

 

 MEDICARE IPPE (WELCOME TO MEDICARE)  2020    



documented as of this encounter



Results

Not on filedocumented in this encounter



Visit Diagnoses







 Diagnosis

 

 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

 

 Brain metastases (HCCode)







 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

 

 Endometrial cancer (HCCode)







 Malignant neoplasm of corpus uteri, except isthmus



documented in this encounter



Insurance







 Payer  Benefit Plan /  Subscriber ID  Effective Dates  Phone  Address  Type



   Group          

 

 MEDICARE  MEDICARE PART A  xxxxxxxxxxx  2020-Present    [address]



  Medicare



   & B - MEDICARE        [place]  



           [id number]  









 Guarantor Name  Account Type  Relation to  Date of  Phone  Billing



     Patient  Birth    Address

 

 Closs,Mary Landry  Personal/Family  Self  1955  [phone]  [address]







         (Home)  [facility],



           [place] [address]



documented as of this encounter
No